# Patient Record
Sex: FEMALE | Race: WHITE | NOT HISPANIC OR LATINO | Employment: STUDENT | URBAN - NONMETROPOLITAN AREA
[De-identification: names, ages, dates, MRNs, and addresses within clinical notes are randomized per-mention and may not be internally consistent; named-entity substitution may affect disease eponyms.]

---
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
## 2022-03-21 NOTE — PROGRESS NOTES
Adolescent Partial RN Group Note and Check List      DATE: 5/8/18  Start Time 1000  End Time 1100    Data:  Communication Skills      Assessment: Patient with therapist for part of group. Patient participated in group activity and group discussion while in group. Patient denies SI/HI. No distress noted.                                                                                                                                                  Plan: Will continue to monitor and encourage.                                                               Oversight provided by psychiatrist including communication with staff delivering services: Yes                                                                         Continuous nursing coverage provided: Yes     Medication education provided       Yes     No X       Labor and Delivery OB Triage                                                  Nonstress Testing Note     S:  Patient is a 24 yo  female @ 37.4 weeks with EDC 2022 with LMP  10/12/21. She presents for scheduled  testing secondary to:[place]lig[place]. Her last SERGEI was 8.1 on 3/18/2022. Last US for growth showed EFW @ 3%, suggestive of symmetrically small fetus. She reports +FM, denies LOF, VB or VD. She is doing well and is without complaint. O:  Maternal Vitals   Temp: 98.5 °F (36.9 °C)   Temp Source: Oral   BP: 133/72   Pulse: 84   Resp: 16     NST: Category 1     FHR: 140s R, +Accels, No Decels, Moderate Variability. [place]: Irregular contractions   Cervix: not examined     BPP 8/8, SERGEI 10. 3! A: 23yo  @ 37.4 weeks, EDC 2022, LMP 10/12/2021 in for NST secondary to    Oligohydramnios of 8.1 on 3/18/2022. -Reactive NST/BPP / with SERGEI 10.3  P: Discharge home with instructions, and fetal kick counts. Continue scheduled  testing and ` Follow-up with Dr. Vaishali Blevins MD on 3/22/2022 @ [phone].

## 2022-03-25 NOTE — PATIENT INSTRUCTIONS
Dizziness  Dizziness is a common problem. It makes you feel unsteady or light-headed. You may feel like you are about to pass out (faint). Dizziness can lead to getting hurt if you stumble or fall. Dizziness can be caused by many things, including:  · Medicines.  · Not having enough water in your body (dehydration).  · Illness.  Follow these instructions at home:  Eating and drinking   · Drink enough fluid to keep your pee (urine) clear or pale yellow. This helps to keep you from getting dehydrated. Try to drink more clear fluids, such as water.  · Do not drink alcohol.  · Limit how much caffeine you drink or eat, if your doctor tells you to do that.  · Limit how much salt (sodium) you drink or eat, if your doctor tells you to do that.  Activity   · Avoid making quick movements.  ¨ When you stand up from sitting in a chair, steady yourself until you feel okay.  ¨ In the morning, first sit up on the side of the bed. When you feel okay, stand slowly while you hold onto something. Do this until you know that your balance is fine.  · If you need to  one place for a long time, move your legs often. Tighten and relax the muscles in your legs while you are standing.  · Do not drive or use heavy machinery if you feel dizzy.  · Avoid bending down if you feel dizzy. Place items in your home so you can reach them easily without leaning over.  Lifestyle   · Do not use any products that contain nicotine or tobacco, such as cigarettes and e-cigarettes. If you need help quitting, ask your doctor.  · Try to lower your stress level. You can do this by using methods such as yoga or meditation. Talk with your doctor if you need help.  General instructions   · Watch your dizziness for any changes.  · Take over-the-counter and prescription medicines only as told by your doctor. Talk with your doctor if you think that you are dizzy because of a medicine that you are taking.  · Tell a friend or a family member that you are feeling  dizzy. If he or she notices any changes in your behavior, have this person call your doctor.  · Keep all follow-up visits as told by your doctor. This is important.  Contact a doctor if:  · Your dizziness does not go away.  · Your dizziness or light-headedness gets worse.  · You feel sick to your stomach (nauseous).  · You have trouble hearing.  · You have new symptoms.  · You are unsteady on your feet.  · You feel like the room is spinning.  Get help right away if:  · You throw up (vomit) or have watery poop (diarrhea), and you cannot eat or drink anything.  · You have trouble:  ¨ Talking.  ¨ Walking.  ¨ Swallowing.  ¨ Using your arms, hands, or legs.  · You feel generally weak.  · You are not thinking clearly, or you have trouble forming sentences. A friend or family member may notice this.  · You have:  ¨ Chest pain.  ¨ Pain in your belly (abdomen).  ¨ Shortness of breath.  ¨ Sweating.  · Your vision changes.  · You are bleeding.  · You have a very bad headache.  · You have neck pain or a stiff neck.  · You have a fever.  These symptoms may be an emergency. Do not wait to see if the symptoms will go away. Get medical help right away. Call your local emergency services (911 in the [place] Do not drive yourself to the hospital.  Summary  · Dizziness makes you feel unsteady or light-headed. You may feel like you are about to pass out (faint).  · Drink enough fluid to keep your pee (urine) clear or pale yellow. Do not drink alcohol.  · Avoid making quick movements if you feel dizzy.  · Watch your dizziness for any changes.  This information is not intended to replace advice given to you by your health care provider. Make sure you discuss any questions you have with your health care provider.  Document Released: 12/06/2012 Document Revised: 01/04/2018 Document Reviewed: 01/04/2018  Elsevier Interactive Patient Education © 2017 [employer]  Otitis Media With Effusion  Otitis media with effusion is the presence of  "fluid in the middle ear. This is a common problem in children, which often follows ear infections. It may be present for weeks or longer after the infection. Unlike an acute ear infection, otitis media with effusion refers only to fluid behind the ear drum and not infection. Children with repeated ear and sinus infections and allergy problems are the most likely to get otitis media with effusion.  CAUSES   The most frequent cause of the fluid buildup is dysfunction of the eustachian tubes. These are the tubes that drain fluid in the ears to the back of the nose (nasopharynx).  SYMPTOMS   · The main symptom of this condition is hearing loss. As a result, you or your child may:  ¨ Listen to the TV at a loud volume.  ¨ Not respond to questions.  ¨ Ask \"what\" often when spoken to.  ¨ Mistake or confuse one sound or word for another.  · There may be a sensation of fullness or pressure but usually not pain.  DIAGNOSIS   · Your health care provider will diagnose this condition by examining you or your child's ears.  · Your health care provider may test the pressure in you or your child's ear with a tympanometer.  · A hearing test may be conducted if the problem persists.  TREATMENT   · Treatment depends on the duration and the effects of the effusion.  · Antibiotics, decongestants, nose drops, and cortisone-type drugs (tablets or nasal spray) may not be helpful.  · Children with persistent ear effusions may have delayed language or behavioral problems. Children at risk for developmental delays in hearing, learning, and speech may require referral to a specialist earlier than children not at risk.  · You or your child's health care provider may suggest a referral to an ear, nose, and throat surgeon for treatment. The following may help restore normal hearing:  ¨ Drainage of fluid.  ¨ Placement of ear tubes (tympanostomy tubes).  ¨ Removal of adenoids (adenoidectomy).  HOME CARE INSTRUCTIONS   · Avoid secondhand " smoke.  · Infants who are  are less likely to have this condition.  · Avoid feeding infants while they are lying flat.  · Avoid known environmental allergens.  · Avoid people who are sick.  SEEK MEDICAL CARE IF:   · Hearing is not better in 3 months.  · Hearing is worse.  · Ear pain.  · Drainage from the ear.  · Dizziness.  MAKE SURE YOU:   · Understand these instructions.  · Will watch your condition.  · Will get help right away if you are not doing well or get worse.  This information is not intended to replace advice given to you by your health care provider. Make sure you discuss any questions you have with your health care provider.  Document Released: 01/25/2006 Document Revised: 01/08/2016 Document Reviewed: 07/15/2014  Elsevier Interactive Patient Education © 2017 [employer] Inc[employer]     Statement Selected

## 2022-10-04 ENCOUNTER — OFFICE VISIT (OUTPATIENT)
Dept: PSYCHIATRY | Facility: CLINIC | Age: 22
End: 2022-10-04

## 2022-10-04 VITALS
SYSTOLIC BLOOD PRESSURE: 121 MMHG | HEIGHT: 62 IN | OXYGEN SATURATION: 98 % | BODY MASS INDEX: 46.01 KG/M2 | DIASTOLIC BLOOD PRESSURE: 80 MMHG | WEIGHT: 250 LBS | HEART RATE: 96 BPM | TEMPERATURE: 98.4 F

## 2022-10-04 DIAGNOSIS — F43.10 PTSD (POST-TRAUMATIC STRESS DISORDER): ICD-10-CM

## 2022-10-04 DIAGNOSIS — F41.1 GENERALIZED ANXIETY DISORDER: Primary | ICD-10-CM

## 2022-10-04 DIAGNOSIS — F90.0 ATTENTION DEFICIT HYPERACTIVITY DISORDER (ADHD), PREDOMINANTLY INATTENTIVE TYPE: ICD-10-CM

## 2022-10-04 DIAGNOSIS — F33.1 MDD (MAJOR DEPRESSIVE DISORDER), RECURRENT EPISODE, MODERATE: ICD-10-CM

## 2022-10-04 PROCEDURE — 90792 PSYCH DIAG EVAL W/MED SRVCS: CPT | Performed by: NURSE PRACTITIONER

## 2022-10-04 RX ORDER — DULOXETIN HYDROCHLORIDE 60 MG/1
60 CAPSULE, DELAYED RELEASE ORAL DAILY
Qty: 30 CAPSULE | Refills: 0 | Status: SHIPPED | OUTPATIENT
Start: 2022-10-04 | End: 2022-11-15 | Stop reason: SDUPTHER

## 2022-10-04 RX ORDER — DULOXETIN HYDROCHLORIDE 30 MG/1
30 CAPSULE, DELAYED RELEASE ORAL DAILY
Qty: 14 CAPSULE | Refills: 0 | Status: SHIPPED | OUTPATIENT
Start: 2022-10-04 | End: 2022-11-15
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
## 2022-10-10 ENCOUNTER — OFFICE VISIT (OUTPATIENT)
Dept: PSYCHIATRY | Facility: CLINIC | Age: 22
End: 2022-10-10

## 2022-10-10 DIAGNOSIS — F43.10 PTSD (POST-TRAUMATIC STRESS DISORDER): ICD-10-CM

## 2022-10-10 DIAGNOSIS — F90.0 ATTENTION DEFICIT HYPERACTIVITY DISORDER (ADHD), PREDOMINANTLY INATTENTIVE TYPE: ICD-10-CM

## 2022-10-10 DIAGNOSIS — F41.1 GENERALIZED ANXIETY DISORDER: Primary | ICD-10-CM

## 2022-10-10 DIAGNOSIS — F33.1 MDD (MAJOR DEPRESSIVE DISORDER), RECURRENT EPISODE, MODERATE: ICD-10-CM

## 2022-10-10 PROCEDURE — 90791 PSYCH DIAGNOSTIC EVALUATION: CPT | Performed by: COUNSELOR
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
## 2022-11-15 ENCOUNTER — OFFICE VISIT (OUTPATIENT)
Dept: PSYCHIATRY | Facility: CLINIC | Age: 22
End: 2022-11-15

## 2022-11-15 VITALS
TEMPERATURE: 97.8 F | HEART RATE: 101 BPM | HEIGHT: 62 IN | OXYGEN SATURATION: 96 % | SYSTOLIC BLOOD PRESSURE: 132 MMHG | DIASTOLIC BLOOD PRESSURE: 82 MMHG | WEIGHT: 261.8 LBS | BODY MASS INDEX: 48.18 KG/M2

## 2022-11-15 DIAGNOSIS — F90.0 ATTENTION DEFICIT HYPERACTIVITY DISORDER (ADHD), PREDOMINANTLY INATTENTIVE TYPE: ICD-10-CM

## 2022-11-15 DIAGNOSIS — F33.1 MDD (MAJOR DEPRESSIVE DISORDER), RECURRENT EPISODE, MODERATE: ICD-10-CM

## 2022-11-15 DIAGNOSIS — F43.10 PTSD (POST-TRAUMATIC STRESS DISORDER): ICD-10-CM

## 2022-11-15 DIAGNOSIS — F41.1 GENERALIZED ANXIETY DISORDER: Primary | ICD-10-CM

## 2022-11-15 DIAGNOSIS — F42.2 MIXED OBSESSIONAL THOUGHTS AND ACTS: ICD-10-CM

## 2022-11-15 PROCEDURE — 99214 OFFICE O/P EST MOD 30 MIN: CPT | Performed by: NURSE PRACTITIONER

## 2022-11-15 RX ORDER — ERGOCALCIFEROL 1.25 MG/1
CAPSULE ORAL
COMMUNITY
Start: 2022-10-06

## 2022-11-15 RX ORDER — DULOXETIN HYDROCHLORIDE 60 MG/1
60 CAPSULE, DELAYED RELEASE ORAL DAILY
Qty: 30 CAPSULE | Refills: 2 | Status: SHIPPED | OUTPATIENT
Start: 2022-11-15 | End: 2023-02-15 | Stop reason: SDUPTHER

## 2022-11-15 NOTE — PROGRESS NOTES
"Joanna Lira is a 22 y.o. female is here today for medication management follow-up.    Chief Complaint:  Recheck on depression and anxiety    History of Present Illness: Patient presents for follow-up at the [facility].  She states that she is doing much better on the Cymbalta.  She currently rates anxiety a 4 out of 10 with 10 being severe.  She has not had panic attacks or flashbacks since beginning the Cymbalta.  She still has some depressive days but states that she is able to pull herself out of that and that that type of mood is not staying.  No negative side effects to the meds.  She is still living with her mom.  Says things are going well there.  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  Her mood is stable.Body mass index is 47.87 kg/m². weight gain 11 lbs since last visit.  Sleeping well.  She does have some vivid dreams occurring but these are not nightmares and she states this is not bothersome.  She continues to have some intrusive thoughts at bedtime.  States that she still thinks that she has to go tell her mom and her sister him that she loves them or \"something bad will happen\".  She says that this is not taking up too much of her time and she feels like she can manage it.  No medical stressors.  She has started therapy and really enjoyed it    The following portions of the patient's history were reviewed and updated as appropriate: allergies, current medications, past family history, past medical history, past social history, past surgical history and problem list.    Review of Systems   Constitutional: Negative for activity change, appetite change and fatigue.   HENT: Negative.    Eyes: Negative for visual disturbance.   Respiratory: Negative.    Cardiovascular: Negative.    Gastrointestinal: Negative for nausea.   Endocrine: Negative.    Genitourinary: Negative.    Musculoskeletal: Negative for " "arthralgias.   Skin: Negative.    Allergic/Immunologic: Negative.    Neurological: Negative for dizziness, seizures and headaches.   Hematological: Negative.    Psychiatric/Behavioral: Negative for agitation, behavioral problems, confusion, decreased concentration, dysphoric mood, hallucinations, self-injury, sleep disturbance and suicidal ideas. The patient is not nervous/anxious and is not hyperactive.        Objective   Physical Exam  Vitals reviewed.   Constitutional:       Appearance: Normal appearance.   Musculoskeletal:      Cervical back: Normal range of motion and neck supple.   Neurological:      General: No focal deficit present.      Mental Status: She is alert and oriented to person, place, and time.   Psychiatric:         Attention and Perception: Attention normal.         Mood and Affect: Mood normal.         Speech: Speech normal.         Behavior: Behavior normal. Behavior is cooperative.         Thought Content: Thought content normal.         Cognition and Memory: Cognition normal.      Comments: Pleasant and cooperative         Blood pressure 132/82, pulse 101, temperature 97.8 °F (36.6 °C), height 157.5 cm (62.01\"), weight 119 kg (261 lb 12.8 oz), SpO2 96 %.    Medication List:   Current Outpatient Medications   Medication Sig Dispense Refill   • DULoxetine (CYMBALTA) 60 MG capsule Take 1 capsule by mouth Daily. 30 capsule 2   • vitamin D (ERGOCALCIFEROL) 1.25 MG (38716 UT) capsule capsule        No current facility-administered medications for this visit.       Mental Status Exam:   Hygiene:   good  Cooperation:  Cooperative  Eye Contact:  Good  Psychomotor Behavior:  Appropriate  Affect:  Full range  Hopelessness: Denies  Speech:  Normal  Thought Process:  Goal directed  Thought Content:  Normal  Suicidal:  None  Homicidal:  None  Hallucinations:  None  Delusion:  None  Memory:  Intact  Orientation:  Person, Place, Time and Situation  Reliability:  good  Insight:  Good  Judgement:  " Good  Impulse Control:  Fair  Physical/Medical Issues:  No     Assessment & Plan   Problems Addressed this Visit        Mental Health    PTSD (post-traumatic stress disorder)    Relevant Medications    DULoxetine (CYMBALTA) 60 MG capsule    Attention deficit hyperactivity disorder (ADHD), predominantly inattentive type    Relevant Medications    DULoxetine (CYMBALTA) 60 MG capsule   Other Visit Diagnoses     Generalized anxiety disorder    -  Primary    Relevant Medications    DULoxetine (CYMBALTA) 60 MG capsule    MDD (major depressive disorder), recurrent episode, moderate (HCC)        Relevant Medications    DULoxetine (CYMBALTA) 60 MG capsule    Mixed obsessional thoughts and acts        Relevant Medications    DULoxetine (CYMBALTA) 60 MG capsule      Diagnoses       Codes Comments    Generalized anxiety disorder    -  Primary ICD-10-CM: F41.1  ICD-9-CM: 300.02     PTSD (post-traumatic stress disorder)     ICD-10-CM: F43.10  ICD-9-CM: 309.81     Attention deficit hyperactivity disorder (ADHD), predominantly inattentive type     ICD-10-CM: F90.0  ICD-9-CM: 314.00     MDD (major depressive disorder), recurrent episode, moderate (HCC)     ICD-10-CM: F33.1  ICD-9-CM: 296.32     Mixed obsessional thoughts and acts     ICD-10-CM: F42.2  ICD-9-CM: 300.3         Functionality: pt having minimal impairment in important areas of daily functioning.  Prognosis: Good dependent on medication/follow up and treatment plan compliance.    She is currently pleased with her progress.  I discussed with her that if the OCD becomes an issue as far as taking up too much of her time or causing distress we can always increase the Cymbalta for that.  For now she does not feel like that is needed.  She will continue the Cymbalta for the anxiety depression, PTSD and panic.  She does not feel like she needs any other medications right now.  She has started therapy and I encouraged her to continue with this as this is the most important  aspect and will probably also help with that OCD component of intrusive thoughts.  She is in agreement. Continuing efforts to promote the therapeutic alliance, address the patient's issues, and strengthen self awareness, insights, and coping skills      Patient is agreeable to call the Clinic with worsening symptoms.    Patient is aware to call 911 or go to the nearest ER should begin having SI/HI. RTC 3 months             This document has been electronically signed by ANNA Vora on   November 15, 2022 09:32 EST.

## 2023-02-15 ENCOUNTER — TELEMEDICINE (OUTPATIENT)
Dept: PSYCHIATRY | Facility: CLINIC | Age: 23
End: 2023-02-15
Payer: COMMERCIAL

## 2023-02-15 DIAGNOSIS — F43.10 PTSD (POST-TRAUMATIC STRESS DISORDER): ICD-10-CM

## 2023-02-15 DIAGNOSIS — F33.1 MDD (MAJOR DEPRESSIVE DISORDER), RECURRENT EPISODE, MODERATE: ICD-10-CM

## 2023-02-15 DIAGNOSIS — F51.5 NIGHTMARES: ICD-10-CM

## 2023-02-15 DIAGNOSIS — F41.1 GENERALIZED ANXIETY DISORDER: Primary | ICD-10-CM

## 2023-02-15 DIAGNOSIS — F42.2 MIXED OBSESSIONAL THOUGHTS AND ACTS: ICD-10-CM

## 2023-02-15 PROCEDURE — 99214 OFFICE O/P EST MOD 30 MIN: CPT | Performed by: NURSE PRACTITIONER

## 2023-02-15 RX ORDER — DULOXETIN HYDROCHLORIDE 30 MG/1
30 CAPSULE, DELAYED RELEASE ORAL DAILY
Qty: 30 CAPSULE | Refills: 1 | Status: SHIPPED | OUTPATIENT
Start: 2023-02-15 | End: 2024-02-15

## 2023-02-15 RX ORDER — DULOXETIN HYDROCHLORIDE 60 MG/1
60 CAPSULE, DELAYED RELEASE ORAL DAILY
Qty: 30 CAPSULE | Refills: 1 | Status: SHIPPED | OUTPATIENT
Start: 2023-02-15

## 2023-02-15 RX ORDER — PRAZOSIN HYDROCHLORIDE 1 MG/1
1 CAPSULE ORAL NIGHTLY
Qty: 30 CAPSULE | Refills: 1 | Status: SHIPPED | OUTPATIENT
Start: 2023-02-15

## 2023-02-15 RX ORDER — OMEPRAZOLE 40 MG/1
CAPSULE, DELAYED RELEASE ORAL
COMMUNITY
Start: 2022-12-19
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
## 2023-04-11 ENCOUNTER — TELEMEDICINE (OUTPATIENT)
Dept: PSYCHIATRY | Facility: CLINIC | Age: 23
End: 2023-04-11
Payer: COMMERCIAL

## 2023-04-11 DIAGNOSIS — F43.10 PTSD (POST-TRAUMATIC STRESS DISORDER): ICD-10-CM

## 2023-04-11 DIAGNOSIS — F42.2 MIXED OBSESSIONAL THOUGHTS AND ACTS: ICD-10-CM

## 2023-04-11 DIAGNOSIS — G47.9 TROUBLE IN SLEEPING: ICD-10-CM

## 2023-04-11 DIAGNOSIS — F41.1 GENERALIZED ANXIETY DISORDER: Primary | ICD-10-CM

## 2023-04-11 DIAGNOSIS — F33.1 MDD (MAJOR DEPRESSIVE DISORDER), RECURRENT EPISODE, MODERATE: ICD-10-CM

## 2023-04-11 DIAGNOSIS — F51.5 NIGHTMARES: ICD-10-CM

## 2023-04-11 PROCEDURE — 99214 OFFICE O/P EST MOD 30 MIN: CPT | Performed by: NURSE PRACTITIONER

## 2023-04-11 PROCEDURE — 1160F RVW MEDS BY RX/DR IN RCRD: CPT | Performed by: NURSE PRACTITIONER

## 2023-04-11 PROCEDURE — 1159F MED LIST DOCD IN RCRD: CPT | Performed by: NURSE PRACTITIONER

## 2023-04-11 RX ORDER — HYDROXYZINE HYDROCHLORIDE 25 MG/1
TABLET, FILM COATED ORAL
Qty: 30 TABLET | Refills: 1 | Status: SHIPPED | OUTPATIENT
Start: 2023-04-11

## 2023-04-11 RX ORDER — PRAZOSIN HYDROCHLORIDE 1 MG/1
1 CAPSULE ORAL NIGHTLY
Qty: 30 CAPSULE | Refills: 1 | Status: SHIPPED | OUTPATIENT
Start: 2023-04-11

## 2023-04-11 RX ORDER — DULOXETIN HYDROCHLORIDE 30 MG/1
30 CAPSULE, DELAYED RELEASE ORAL DAILY
Qty: 30 CAPSULE | Refills: 1 | Status: SHIPPED | OUTPATIENT
Start: 2023-04-11 | End: 2024-04-10

## 2023-04-11 RX ORDER — DULOXETIN HYDROCHLORIDE 60 MG/1
60 CAPSULE, DELAYED RELEASE ORAL DAILY
Qty: 30 CAPSULE | Refills: 1 | Status: SHIPPED | OUTPATIENT
Start: 2023-04-11

## 2023-04-11 NOTE — PROGRESS NOTES
Joanna Lira is a 22 y.o. female is here today for medication management follow-up.“This provider is located at [facility], [address] [place] [address]. The provider identified herself as well as her credentials.   The Patient is located in her car. The patient's condition being diagnosed/treated is appropriate for telemedicine. The patient gave consent to be seen remotely, and when consent is given they understand that the consent allows for patient identifiable information to be sent to a third party as needed.   They may refuse to be seen remotely at any time. The electronic data is encrypted and password protected, and the patient has been advised of the potential risks to privacy not withstanding such measures.  Mother and sister are both located in the car and she gives permission for the visit to occur in front of them.    Chief Complaint:  Recheck on depression and anxiety    History of Present Illness: Patient presents for her follow-up.  She states that the increase in the Cymbalta did definitely help with her depression.  She and her boyfriend have broken up recently.  She is handling this.  She currently rates her depression a 3-4 out of 10 with 10 being severe.  Rates anxiety a 5 out of 10.  She says the increase in the Cymbalta did really help with her depressive symptoms and she feels like medications are adequate presently.  The prazosin did stop her nightmares however she is waking up several times a night now.  She this has been since the increase in the Cymbalta.  Her mood is stable.  No medical stressors.  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  She hopes to get a job at [facility] [employer] [facility] [place]. PHQ-2 Depression Screening  Little interest or pleasure in doing things? 0-->not at all   Feeling down, depressed, or hopeless? 0-->not at all   PHQ-2 Total Score 0         The following portions of the patient's history were reviewed and updated as appropriate:  allergies, current medications, past family history, past medical history, past social history, past surgical history and problem list.    Review of Systems   Constitutional: Negative for activity change, appetite change and fatigue.   HENT: Negative.    Eyes: Negative for visual disturbance.   Respiratory: Negative.    Cardiovascular: Negative.    Gastrointestinal: Negative for nausea.   Endocrine: Negative.    Genitourinary: Negative.    Musculoskeletal: Negative for arthralgias.   Skin: Negative.    Allergic/Immunologic: Negative.    Neurological: Negative for dizziness, seizures and headaches.   Hematological: Negative.    Psychiatric/Behavioral: Positive for sleep disturbance. Negative for agitation, behavioral problems, confusion, decreased concentration, dysphoric mood, hallucinations, self-injury and suicidal ideas. The patient is not nervous/anxious and is not hyperactive.      Reviewed copied data and there are no changes    Objective   Physical Exam  Vitals reviewed.   Constitutional:       Appearance: Normal appearance.   Musculoskeletal:      Cervical back: Normal range of motion and neck supple.   Neurological:      General: No focal deficit present.      Mental Status: She is alert and oriented to person, place, and time.   Psychiatric:         Attention and Perception: Attention normal.         Mood and Affect: Mood normal.         Speech: Speech normal.         Behavior: Behavior normal. Behavior is cooperative.         Thought Content: Thought content normal.         Cognition and Memory: Cognition normal.      Comments: Pleasant and cooperative         There were no vitals taken for this visit.    Medication List:   Current Outpatient Medications   Medication Sig Dispense Refill   • DULoxetine (Cymbalta) 30 MG capsule Take 1 capsule by mouth Daily. Take along with the 30mg for total of 90mg 30 capsule 1   • DULoxetine (CYMBALTA) 60 MG capsule Take 1 capsule by mouth Daily. Take along with the 30mg  for total of 90mg 30 capsule 1   • prazosin (MINIPRESS) 1 MG capsule Take 1 capsule by mouth Every Night. 30 capsule 1   • hydrOXYzine (ATARAX) 25 MG tablet 1/2-1 po at HS 30 tablet 1   • omeprazole (priLOSEC) 40 MG capsule      • vitamin D (ERGOCALCIFEROL) 1.25 MG (42793 UT) capsule capsule        No current facility-administered medications for this visit.     Reviewed copied data and there are no changes    Mental Status Exam:   Hygiene:   good  Cooperation:  Cooperative  Eye Contact:  Good  Psychomotor Behavior:  Appropriate  Affect:  Full range  Hopelessness: Denies  Speech:  Normal  Thought Process:  Goal directed  Thought Content:  Normal  Suicidal:  None  Homicidal:  None  Hallucinations:  None  Delusion:  None  Memory:  Intact  Orientation:  Person, Place, Time and Situation  Reliability:  good  Insight:  Good  Judgement:  Good  Impulse Control:  Fair  Physical/Medical Issues:  No     Assessment & Plan   Problems Addressed this Visit        Mental Health    PTSD (post-traumatic stress disorder)    Relevant Medications    DULoxetine (CYMBALTA) 60 MG capsule    DULoxetine (Cymbalta) 30 MG capsule    hydrOXYzine (ATARAX) 25 MG tablet   Other Visit Diagnoses     Generalized anxiety disorder    -  Primary    Relevant Medications    DULoxetine (CYMBALTA) 60 MG capsule    DULoxetine (Cymbalta) 30 MG capsule    hydrOXYzine (ATARAX) 25 MG tablet    MDD (major depressive disorder), recurrent episode, moderate        Relevant Medications    DULoxetine (CYMBALTA) 60 MG capsule    DULoxetine (Cymbalta) 30 MG capsule    hydrOXYzine (ATARAX) 25 MG tablet    Mixed obsessional thoughts and acts        Relevant Medications    DULoxetine (CYMBALTA) 60 MG capsule    DULoxetine (Cymbalta) 30 MG capsule    Nightmares        Relevant Medications    DULoxetine (CYMBALTA) 60 MG capsule    DULoxetine (Cymbalta) 30 MG capsule    prazosin (MINIPRESS) 1 MG capsule    hydrOXYzine (ATARAX) 25 MG tablet    Trouble in sleeping         Relevant Medications    hydrOXYzine (ATARAX) 25 MG tablet      Diagnoses       Codes Comments    Generalized anxiety disorder    -  Primary ICD-10-CM: F41.1  ICD-9-CM: 300.02     MDD (major depressive disorder), recurrent episode, moderate     ICD-10-CM: F33.1  ICD-9-CM: 296.32     PTSD (post-traumatic stress disorder)     ICD-10-CM: F43.10  ICD-9-CM: 309.81     Mixed obsessional thoughts and acts     ICD-10-CM: F42.2  ICD-9-CM: 300.3     Nightmares     ICD-10-CM: F51.5  ICD-9-CM: 307.47     Trouble in sleeping     ICD-10-CM: G47.9  ICD-9-CM: 780.50         Functionality: pt having minimal impairment in important areas of daily functioning.  Prognosis: Good dependent on medication/follow up and treatment plan compliance.    She is currently pleased with progress.  She will continue the Cymbalta for the PTSD, anxiety and depression as well as OCD.  Continue the prazosin for nightmares.  I am going to add some hydroxyzine as needed for sleep.  She will start by taking half a tablet if this does not help she can take a whole tablet.  Refills have been submitted.. Continuing efforts to promote the therapeutic alliance, address the patient's issues, and strengthen self awareness, insights, and coping skills      Patient is agreeable to call the Clinic with worsening symptoms.    Patient is aware to call 911 or go to the nearest ER should begin having SI/HI. RTC 2 months.  Sooner if needed             This document has been electronically signed by ANNA Vora on   April 11, 2023 16:28 EDT.

## 2023-06-08 ENCOUNTER — TELEMEDICINE (OUTPATIENT)
Dept: PSYCHIATRY | Facility: CLINIC | Age: 23
End: 2023-06-08
Payer: COMMERCIAL

## 2023-06-08 DIAGNOSIS — F33.1 MDD (MAJOR DEPRESSIVE DISORDER), RECURRENT EPISODE, MODERATE: ICD-10-CM

## 2023-06-08 DIAGNOSIS — F43.10 PTSD (POST-TRAUMATIC STRESS DISORDER): ICD-10-CM

## 2023-06-08 DIAGNOSIS — G47.9 TROUBLE IN SLEEPING: ICD-10-CM

## 2023-06-08 DIAGNOSIS — F41.1 GENERALIZED ANXIETY DISORDER: Primary | ICD-10-CM

## 2023-06-08 DIAGNOSIS — F51.5 NIGHTMARES: ICD-10-CM

## 2023-06-08 DIAGNOSIS — F42.2 MIXED OBSESSIONAL THOUGHTS AND ACTS: ICD-10-CM

## 2023-06-08 PROCEDURE — 99214 OFFICE O/P EST MOD 30 MIN: CPT | Performed by: NURSE PRACTITIONER

## 2023-06-08 PROCEDURE — 1160F RVW MEDS BY RX/DR IN RCRD: CPT | Performed by: NURSE PRACTITIONER

## 2023-06-08 PROCEDURE — 1159F MED LIST DOCD IN RCRD: CPT | Performed by: NURSE PRACTITIONER

## 2023-06-08 RX ORDER — PROPRANOLOL HYDROCHLORIDE 10 MG/1
10 TABLET ORAL DAILY PRN
Qty: 30 TABLET | Refills: 2 | Status: SHIPPED | OUTPATIENT
Start: 2023-06-08

## 2023-06-08 RX ORDER — DULOXETIN HYDROCHLORIDE 30 MG/1
30 CAPSULE, DELAYED RELEASE ORAL DAILY
Qty: 30 CAPSULE | Refills: 2 | Status: SHIPPED | OUTPATIENT
Start: 2023-06-08 | End: 2024-06-07

## 2023-06-08 RX ORDER — PRAZOSIN HYDROCHLORIDE 1 MG/1
1 CAPSULE ORAL NIGHTLY
Qty: 30 CAPSULE | Refills: 2 | Status: SHIPPED | OUTPATIENT
Start: 2023-06-08

## 2023-06-08 RX ORDER — DULOXETIN HYDROCHLORIDE 60 MG/1
60 CAPSULE, DELAYED RELEASE ORAL DAILY
Qty: 30 CAPSULE | Refills: 2 | Status: SHIPPED | OUTPATIENT
Start: 2023-06-08

## 2023-06-08 RX ORDER — HYDROXYZINE HYDROCHLORIDE 25 MG/1
TABLET, FILM COATED ORAL
Qty: 30 TABLET | Refills: 2 | Status: SHIPPED | OUTPATIENT
Start: 2023-06-08

## 2023-06-08 NOTE — PROGRESS NOTES
Joanna Lira is a 22 y.o. female is here today for medication management follow-up.“This provider is located at [facility], [address] [place] [address]. The provider identified herself as well as her credentials.   The Patient is located in her car. The patient's condition being diagnosed/treated is appropriate for telemedicine. The patient gave consent to be seen remotely, and when consent is given they understand that the consent allows for patient identifiable information to be sent to a third party as needed.   They may refuse to be seen remotely at any time. The electronic data is encrypted and password protected, and the patient has been advised of the potential risks to privacy not withstanding such measures.  Mother and sister are both located in the car and she gives permission for the visit to occur in front of them.    Chief Complaint:  Recheck on depression and anxiety    History of Present Illness: Patient states that she continues to do very well.  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  She states she got very nervous.  This that she has always had severe test taking anxiety basically her entire life.  She denies any current depression or excessive anxiety.  Her has recently quit her job at [employer] due to working conditions and is looking for another part-time job now.  Sleeping well at night without difficulty.  No panic attacks.  No nightmares.  No medical stressors.  He says the hydroxyzine some for anxiety but it does make her drowsy mood is stable.  The increase with the Cymbalta dosing continues to help.  She is very pleased with her progress.        The following portions of the patient's history were reviewed and updated as appropriate: allergies, current medications, past family history, past medical history, past social history, past surgical history and problem list.    Review of Systems   Constitutional:  Negative for activity  change, appetite change and fatigue.   HENT: Negative.     Eyes:  Negative for visual disturbance.   Respiratory: Negative.     Cardiovascular: Negative.    Gastrointestinal:  Negative for nausea.   Endocrine: Negative.    Genitourinary: Negative.    Musculoskeletal:  Negative for arthralgias.   Skin: Negative.    Allergic/Immunologic: Negative.    Neurological:  Negative for dizziness, seizures and headaches.   Hematological: Negative.    Psychiatric/Behavioral:  Negative for agitation, behavioral problems, confusion, decreased concentration, dysphoric mood, hallucinations, self-injury, sleep disturbance and suicidal ideas. The patient is nervous/anxious. The patient is not hyperactive.    Reviewed copied data and there are no changes    Objective   Physical Exam  Vitals reviewed.   Constitutional:       Appearance: Normal appearance.   Musculoskeletal:      Cervical back: Normal range of motion and neck supple.   Neurological:      General: No focal deficit present.      Mental Status: She is alert and oriented to person, place, and time.   Psychiatric:         Attention and Perception: Attention normal.         Mood and Affect: Mood normal.         Speech: Speech normal.         Behavior: Behavior normal. Behavior is cooperative.         Thought Content: Thought content normal.         Cognition and Memory: Cognition normal.      Comments: Pleasant and cooperative       There were no vitals taken for this visit.    Medication List:   Current Outpatient Medications   Medication Sig Dispense Refill    DULoxetine (Cymbalta) 30 MG capsule Take 1 capsule by mouth Daily. Take along with the 30mg for total of 90mg 30 capsule 2    DULoxetine (CYMBALTA) 60 MG capsule Take 1 capsule by mouth Daily. Take along with the 30mg for total of 90mg 30 capsule 2    hydrOXYzine (ATARAX) 25 MG tablet 1/2-1 po at HS 30 tablet 2    prazosin (MINIPRESS) 1 MG capsule Take 1 capsule by mouth Every Night. 30 capsule 2    omeprazole  (priLOSEC) 40 MG capsule       propranolol (INDERAL) 10 MG tablet Take 1 tablet by mouth Daily As Needed (anxiety). 30 tablet 2    vitamin D (ERGOCALCIFEROL) 1.25 MG (64190 UT) capsule capsule        No current facility-administered medications for this visit.     Reviewed copied data and there are no changes    Mental Status Exam:   Hygiene:   good  Cooperation:  Cooperative  Eye Contact:  Good  Psychomotor Behavior:  Appropriate  Affect:  Full range  Hopelessness: Denies  Speech:  Normal  Thought Process:  Goal directed  Thought Content:  Normal  Suicidal:  None  Homicidal:  None  Hallucinations:  None  Delusion:  None  Memory:  Intact  Orientation:  Person, Place, Time and Situation  Reliability:  good  Insight:  Good  Judgement:  Good  Impulse Control:  Fair  Physical/Medical Issues:  No     Assessment & Plan   Problems Addressed this Visit          Mental Health    PTSD (post-traumatic stress disorder)    Relevant Medications    hydrOXYzine (ATARAX) 25 MG tablet    DULoxetine (CYMBALTA) 60 MG capsule    DULoxetine (Cymbalta) 30 MG capsule     Other Visit Diagnoses       Generalized anxiety disorder    -  Primary    Relevant Medications    hydrOXYzine (ATARAX) 25 MG tablet    DULoxetine (CYMBALTA) 60 MG capsule    DULoxetine (Cymbalta) 30 MG capsule    propranolol (INDERAL) 10 MG tablet    MDD (major depressive disorder), recurrent episode, moderate        Relevant Medications    hydrOXYzine (ATARAX) 25 MG tablet    DULoxetine (CYMBALTA) 60 MG capsule    DULoxetine (Cymbalta) 30 MG capsule    Mixed obsessional thoughts and acts        Relevant Medications    DULoxetine (CYMBALTA) 60 MG capsule    DULoxetine (Cymbalta) 30 MG capsule    Nightmares        Relevant Medications    prazosin (MINIPRESS) 1 MG capsule    hydrOXYzine (ATARAX) 25 MG tablet    DULoxetine (CYMBALTA) 60 MG capsule    DULoxetine (Cymbalta) 30 MG capsule    Trouble in sleeping        Relevant Medications    hydrOXYzine (ATARAX) 25 MG tablet           Diagnoses         Codes Comments    Generalized anxiety disorder    -  Primary ICD-10-CM: F41.1  ICD-9-CM: 300.02     MDD (major depressive disorder), recurrent episode, moderate     ICD-10-CM: F33.1  ICD-9-CM: 296.32     PTSD (post-traumatic stress disorder)     ICD-10-CM: F43.10  ICD-9-CM: 309.81     Mixed obsessional thoughts and acts     ICD-10-CM: F42.2  ICD-9-CM: 300.3     Nightmares     ICD-10-CM: F51.5  ICD-9-CM: 307.47     Trouble in sleeping     ICD-10-CM: G47.9  ICD-9-CM: 780.50           Functionality: pt having minimal impairment in important areas of daily functioning.  Prognosis: Good dependent on medication/follow up and treatment plan compliance.    She is currently pleased with progress.  She will continue the Cymbalta for the PTSD, anxiety and depression as well as OCD.  Continue the prazosin for nightmares.  I am going to add some Inderal as needed for anxiety for her test taking.  I did discuss with her the risks, benefits, side effects and how taking it along with prazosin can cause some low blood pressure so she is going to monitor for this.  Refills have been submitted.. Continuing efforts to promote the therapeutic alliance, address the patient's issues, and strengthen self awareness, insights, and coping skills  Patient screened positive for depression based on a PHQ-9 score of 0 on 4/11/2023. Follow-up recommendations include: Prescribed antidepressant medication treatment.        Patient is agreeable to call the Clinic with worsening symptoms.    Patient is aware to call 911 or go to the nearest ER should begin having SI/HI. RTC 3 months.  Sooner if needed             This document has been electronically signed by ANNA Vora on   June 8, 2023 15:26 EDT.

## 2023-08-16 ENCOUNTER — OFFICE VISIT (OUTPATIENT)
Dept: PSYCHIATRY | Facility: CLINIC | Age: 23
End: 2023-08-16
Payer: COMMERCIAL

## 2023-08-16 VITALS
OXYGEN SATURATION: 99 % | WEIGHT: 271.8 LBS | TEMPERATURE: 98.2 F | DIASTOLIC BLOOD PRESSURE: 83 MMHG | BODY MASS INDEX: 50.02 KG/M2 | HEART RATE: 100 BPM | HEIGHT: 62 IN | SYSTOLIC BLOOD PRESSURE: 112 MMHG

## 2023-08-16 DIAGNOSIS — F90.0 ATTENTION DEFICIT HYPERACTIVITY DISORDER (ADHD), PREDOMINANTLY INATTENTIVE TYPE: ICD-10-CM

## 2023-08-16 DIAGNOSIS — F42.2 MIXED OBSESSIONAL THOUGHTS AND ACTS: ICD-10-CM

## 2023-08-16 DIAGNOSIS — F51.5 NIGHTMARES: ICD-10-CM

## 2023-08-16 DIAGNOSIS — G47.9 TROUBLE IN SLEEPING: ICD-10-CM

## 2023-08-16 DIAGNOSIS — F50.81 MILD BINGE-EATING DISORDER: Primary | ICD-10-CM

## 2023-08-16 DIAGNOSIS — F33.1 MDD (MAJOR DEPRESSIVE DISORDER), RECURRENT EPISODE, MODERATE: ICD-10-CM

## 2023-08-16 DIAGNOSIS — F43.10 PTSD (POST-TRAUMATIC STRESS DISORDER): ICD-10-CM

## 2023-08-16 DIAGNOSIS — F41.1 GENERALIZED ANXIETY DISORDER: ICD-10-CM

## 2023-08-16 PROCEDURE — 99214 OFFICE O/P EST MOD 30 MIN: CPT | Performed by: NURSE PRACTITIONER

## 2023-08-16 PROCEDURE — 1160F RVW MEDS BY RX/DR IN RCRD: CPT | Performed by: NURSE PRACTITIONER

## 2023-08-16 PROCEDURE — 1159F MED LIST DOCD IN RCRD: CPT | Performed by: NURSE PRACTITIONER

## 2023-08-16 RX ORDER — HYDROXYZINE HYDROCHLORIDE 25 MG/1
TABLET, FILM COATED ORAL
Qty: 30 TABLET | Refills: 2 | Status: SHIPPED | OUTPATIENT
Start: 2023-08-16

## 2023-08-16 RX ORDER — DULOXETIN HYDROCHLORIDE 30 MG/1
30 CAPSULE, DELAYED RELEASE ORAL DAILY
Qty: 30 CAPSULE | Refills: 2 | Status: SHIPPED | OUTPATIENT
Start: 2023-08-16 | End: 2024-08-15

## 2023-08-16 RX ORDER — DULOXETIN HYDROCHLORIDE 60 MG/1
60 CAPSULE, DELAYED RELEASE ORAL DAILY
Qty: 30 CAPSULE | Refills: 2 | Status: SHIPPED | OUTPATIENT
Start: 2023-08-16

## 2023-08-16 RX ORDER — PRAZOSIN HYDROCHLORIDE 1 MG/1
1 CAPSULE ORAL NIGHTLY
Qty: 30 CAPSULE | Refills: 2 | Status: SHIPPED | OUTPATIENT
Start: 2023-08-16

## 2023-08-16 NOTE — PROGRESS NOTES
"      Joanna Lira is a 22 y.o. 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  She states that she started self doubting and feeling like she could not do it.  She is actually not very sure if that is what she wants to do.  That made her have some depressive days.  But she states overall she has no overt depression.  It is more situational.  Anxiety is present and situational.  She states that her mind is constantly going and will not shut off.  She gets overwhelmed very easily gets irritated and agitated from being overwhelmed.  Has trouble completing tasks.  Procrastinates.  Loses things frequently.  Has taken stimulants in the past.  Had various issues with them but she states she was so young and her life was so chaotic at that time she does not know.  When she drinks an energy drink it has no negative effect on her it helps her complete things and focus better.  She is sleeping well at night without difficulty.  Has an occasional nightmare but feels like her current dosage of prazosin is adequate.  She is taking the hydroxyzine for sleep as well.  Has Inderal at home to use as needed for anxiety but is not requiring the use of it.  Body mass index is 49.7 kg/mý.  Gain of 10 pound since last office visit.  She states that she does binge eat and has done this for as long as she can remember.  She states it occurs about once a week.  She will eat a very large consumption of food.  At times to the point where she vomits.  She feels very guilty afterwards.  She uses delta 9 thc gummies a couple times a week.  She states this is to slow her mind down so that she can sleep better because that is when her mind is the worst.  She denies any other substance use.  Continues to live with her mom and things are going " "well there.  She plans on possibly finding a job until she can figure out what she wants to do as far as furthering her education.        The following portions of the patient's history were reviewed and updated as appropriate: allergies, current medications, past family history, past medical history, past social history, past surgical history and problem list.    Review of Systems   Constitutional:  Negative for activity change, appetite change and fatigue.   HENT: Negative.     Eyes:  Negative for visual disturbance.   Respiratory: Negative.     Cardiovascular: Negative.    Gastrointestinal:  Negative for nausea.   Endocrine: Negative.    Genitourinary: Negative.    Musculoskeletal:  Negative for arthralgias.   Skin: Negative.    Allergic/Immunologic: Negative.    Neurological:  Negative for dizziness, seizures and headaches.   Hematological: Negative.    Psychiatric/Behavioral:  Negative for agitation, behavioral problems, confusion, decreased concentration, dysphoric mood, hallucinations, self-injury, sleep disturbance and suicidal ideas. The patient is nervous/anxious. The patient is not hyperactive.    Reviewed copied data and there are no changes    Objective   Physical Exam  Vitals reviewed.   Constitutional:       Appearance: Normal appearance.   Musculoskeletal:      Cervical back: Normal range of motion and neck supple.   Neurological:      General: No focal deficit present.      Mental Status: She is alert and oriented to person, place, and time.   Psychiatric:         Attention and Perception: Attention normal.         Mood and Affect: Mood normal.         Speech: Speech normal.         Behavior: Behavior normal. Behavior is cooperative.         Thought Content: Thought content normal.         Cognition and Memory: Cognition normal.      Comments: Pleasant and cooperative       Blood pressure 112/83, pulse 100, temperature 98.2 øF (36.8 øC), height 157.5 cm (62.01\"), weight 123 kg (271 lb 12.8 oz), SpO2 " 99 %.    Medication List:   Current Outpatient Medications   Medication Sig Dispense Refill    DULoxetine (Cymbalta) 30 MG capsule Take 1 capsule by mouth Daily. Take along with the 30mg for total of 90mg 30 capsule 2    DULoxetine (CYMBALTA) 60 MG capsule Take 1 capsule by mouth Daily. Take along with the 30mg for total of 90mg 30 capsule 2    hydrOXYzine (ATARAX) 25 MG tablet 1/2-1 po at HS 30 tablet 2    prazosin (MINIPRESS) 1 MG capsule Take 1 capsule by mouth Every Night. 30 capsule 2    lisdexamfetamine (Vyvanse) 30 MG capsule Take 1 capsule by mouth Every Morning 30 capsule 0    omeprazole (priLOSEC) 40 MG capsule       propranolol (INDERAL) 10 MG tablet Take 1 tablet by mouth Daily As Needed (anxiety). 30 tablet 2    vitamin D (ERGOCALCIFEROL) 1.25 MG (50490 UT) capsule capsule        No current facility-administered medications for this visit.     Reviewed copied data and there are no changes    Mental Status Exam:   Hygiene:   good  Cooperation:  Cooperative  Eye Contact:  Good  Psychomotor Behavior:  Appropriate  Affect:  Full range  Hopelessness: Denies  Speech:  Normal  Thought Process:  Goal directed  Thought Content:  Normal  Suicidal:  None  Homicidal:  None  Hallucinations:  None  Delusion:  None  Memory:  Intact  Orientation:  Person, Place, Time and Situation  Reliability:  good  Insight:  Good  Judgement:  Good  Impulse Control:  Fair  Physical/Medical Issues:  No     Assessment & Plan   Problems Addressed this Visit          Mental Health    PTSD (post-traumatic stress disorder)    Relevant Medications    lisdexamfetamine (Vyvanse) 30 MG capsule    DULoxetine (Cymbalta) 30 MG capsule    DULoxetine (CYMBALTA) 60 MG capsule    hydrOXYzine (ATARAX) 25 MG tablet    Attention deficit hyperactivity disorder (ADHD), predominantly inattentive type    Relevant Medications    lisdexamfetamine (Vyvanse) 30 MG capsule    DULoxetine (Cymbalta) 30 MG capsule    DULoxetine (CYMBALTA) 60 MG capsule     hydrOXYzine (ATARAX) 25 MG tablet     Other Visit Diagnoses       Mild binge-eating disorder    -  Primary    Relevant Medications    lisdexamfetamine (Vyvanse) 30 MG capsule    DULoxetine (Cymbalta) 30 MG capsule    DULoxetine (CYMBALTA) 60 MG capsule    hydrOXYzine (ATARAX) 25 MG tablet    Generalized anxiety disorder        Relevant Medications    lisdexamfetamine (Vyvanse) 30 MG capsule    DULoxetine (Cymbalta) 30 MG capsule    DULoxetine (CYMBALTA) 60 MG capsule    hydrOXYzine (ATARAX) 25 MG tablet    MDD (major depressive disorder), recurrent episode, moderate        Relevant Medications    lisdexamfetamine (Vyvanse) 30 MG capsule    DULoxetine (Cymbalta) 30 MG capsule    DULoxetine (CYMBALTA) 60 MG capsule    hydrOXYzine (ATARAX) 25 MG tablet    Mixed obsessional thoughts and acts        Relevant Medications    DULoxetine (Cymbalta) 30 MG capsule    DULoxetine (CYMBALTA) 60 MG capsule    Nightmares        Relevant Medications    lisdexamfetamine (Vyvanse) 30 MG capsule    DULoxetine (Cymbalta) 30 MG capsule    DULoxetine (CYMBALTA) 60 MG capsule    prazosin (MINIPRESS) 1 MG capsule    hydrOXYzine (ATARAX) 25 MG tablet    Trouble in sleeping        Relevant Medications    hydrOXYzine (ATARAX) 25 MG tablet          Diagnoses         Codes Comments    Mild binge-eating disorder    -  Primary ICD-10-CM: F50.81  ICD-9-CM: 307.1     Generalized anxiety disorder     ICD-10-CM: F41.1  ICD-9-CM: 300.02     PTSD (post-traumatic stress disorder)     ICD-10-CM: F43.10  ICD-9-CM: 309.81     MDD (major depressive disorder), recurrent episode, moderate     ICD-10-CM: F33.1  ICD-9-CM: 296.32     Mixed obsessional thoughts and acts     ICD-10-CM: F42.2  ICD-9-CM: 300.3     Nightmares     ICD-10-CM: F51.5  ICD-9-CM: 307.47     Trouble in sleeping     ICD-10-CM: G47.9  ICD-9-CM: 780.50     Attention deficit hyperactivity disorder (ADHD), predominantly inattentive type     ICD-10-CM: F90.0  ICD-9-CM: 314.00            Functionality: pt having moderate impairment in important areas of daily functioning.  Prognosis: Good dependent on medication/follow up and treatment plan compliance.  Rafita reviewed.  No medications listed.  Uds performed today andpending    We had a lengthy discussion.  I am going to try her back on a stimulant for the ADHD.  Risks, benefits, side effects of the medication were discussed.  Patient realizes the medication has abuse potential.As part of the patient's treatment plan they are being prescribed a controlled substance with potential for abuse.  The patient has been made aware of the appropriate use of such medications,  It has been made clear these medications are for use by the patient only, without concomitant use of alcohol or other substances unless prescribed/advised by medical provider. Patient has completed prescribing agreement detailing term of continued prescribing of controlled substances including monitoring RAFITA reports, urine drug screens, and pill counts.  The patient is aware RAFITA reports are reviewed on a regular basis and scanned into the chart. Patient is aware the medication has abuse potential.     Also discussed that these medications are not recommended in pregnancy.  Should she become sexually active she will need to seek out birth control prior to this.  She verbalized understanding.  So discussed the use of marijuana that it was not advised especially while taking a controlled substance.     She will continue the Cymbalta for the OCD, anxiety, depression and PTSD, continue the Minipress for nightmares continue hydroxyzine for sleep.  Adding the Vyvanse for the ADHD.  Refills of all been submitted.  She has failed Concerta and Adderall in the past so I will be trying to preauthorize Vyvanse.  Also scheduling her back with her therapist as I feel like this could benefit her with coping skills and helping her to make decisions as far as her future goes and she is in  agreement to this.   Continuing efforts to promote the therapeutic alliance, address the patient's issues, and strengthen self awareness, insights, and coping skill      Patient is agreeable to call the Clinic with worsening symptoms.    Patient is aware to call 911 or go to the nearest ER should begin having SI/HI. RTC 1 months.  Sooner if needed             This document has been electronically signed by ANNA Vora on   August 16, 2023 08:59 EDT.

## 2023-09-14 ENCOUNTER — TELEMEDICINE (OUTPATIENT)
Dept: PSYCHIATRY | Facility: CLINIC | Age: 23
End: 2023-09-14
Payer: COMMERCIAL

## 2023-09-14 DIAGNOSIS — F42.2 MIXED OBSESSIONAL THOUGHTS AND ACTS: ICD-10-CM

## 2023-09-14 DIAGNOSIS — F43.10 PTSD (POST-TRAUMATIC STRESS DISORDER): ICD-10-CM

## 2023-09-14 DIAGNOSIS — F51.5 NIGHTMARES: ICD-10-CM

## 2023-09-14 DIAGNOSIS — F33.1 MDD (MAJOR DEPRESSIVE DISORDER), RECURRENT EPISODE, MODERATE: ICD-10-CM

## 2023-09-14 DIAGNOSIS — G47.9 TROUBLE IN SLEEPING: ICD-10-CM

## 2023-09-14 DIAGNOSIS — F90.0 ATTENTION DEFICIT HYPERACTIVITY DISORDER (ADHD), PREDOMINANTLY INATTENTIVE TYPE: Primary | ICD-10-CM

## 2023-09-14 DIAGNOSIS — F41.1 GENERALIZED ANXIETY DISORDER: ICD-10-CM

## 2023-09-14 DIAGNOSIS — F50.81 MILD BINGE-EATING DISORDER: ICD-10-CM

## 2023-09-14 PROCEDURE — 99214 OFFICE O/P EST MOD 30 MIN: CPT | Performed by: NURSE PRACTITIONER

## 2023-09-14 PROCEDURE — 1159F MED LIST DOCD IN RCRD: CPT | Performed by: NURSE PRACTITIONER

## 2023-09-14 PROCEDURE — 1160F RVW MEDS BY RX/DR IN RCRD: CPT | Performed by: NURSE PRACTITIONER

## 2023-09-14 NOTE — PROGRESS NOTES
"      Joanna Lira is a 23 y.o. female is here today for medication management follow-up.Patient currently has covid infection.  ““This provider is located at [facility], [address] [place] [address]. The provider identified herself as well as her credentials.   The Patient is located at home. The patient's condition being diagnosed/treated is appropriate for telemedicine. The patient gave consent to be seen remotely, and when consent is given they understand that the consent allows for patient identifiable information to be sent to a third party as needed.   They may refuse to be seen remotely at any time. The electronic data is encrypted and password protected, and the patient has been advised of the potential risks to privacy not withstanding such measures.   Chief Complaint:  Recheck on depression and anxiety, adhd    History of Present Illness:   Patient states that she is doing \"wonderful\" on the Vyvanse.  She states that \"my brain is so silent\".  She states that she can think much more clear.  She is now working as a  at [employer].  States since she has started the medication she does not get as agitated with customers when they are agitated.  She can tell a big difference in her overall mood.  Focus and concentration is much better.  She states she cannot really tell if she is actually better with binge eating but her desires to binge eat are less.  She has had her wisdom teeth extracted since our last visit and currently has COVID infection so she states she cannot really gauge how much she will improve with binge eating on the medicine until all this has resolved.  She is sleeping well at night without difficulty.  No negative side effects to the meds.  Not having to use the Inderal now.  She states the Vyvanse has really helped with her overall anxiety.  Usually not taking it on the weekends if she does not work.  She denies any depression.  " Anxiety is manageable.  Mood is stable.  She is very much pleased        The following portions of the patient's history were reviewed and updated as appropriate: allergies, current medications, past family history, past medical history, past social history, past surgical history and problem list.    Review of Systems   Constitutional:  Negative for activity change, appetite change and fatigue.   HENT: Negative.     Eyes:  Negative for visual disturbance.   Respiratory: Negative.     Cardiovascular: Negative.    Gastrointestinal:  Negative for nausea.   Endocrine: Negative.    Genitourinary: Negative.    Musculoskeletal:  Negative for arthralgias.   Skin: Negative.    Allergic/Immunologic: Negative.    Neurological:  Negative for dizziness, seizures and headaches.   Hematological: Negative.    Psychiatric/Behavioral:  Negative for agitation, behavioral problems, confusion, decreased concentration, dysphoric mood, hallucinations, self-injury, sleep disturbance and suicidal ideas. The patient is nervous/anxious. The patient is not hyperactive.    Reviewed copied data and there are no changes    Objective   Physical Exam  Vitals reviewed.   Constitutional:       Appearance: Normal appearance.   Musculoskeletal:      Cervical back: Normal range of motion and neck supple.   Neurological:      General: No focal deficit present.      Mental Status: She is alert and oriented to person, place, and time.   Psychiatric:         Attention and Perception: Attention normal.         Mood and Affect: Mood normal.         Speech: Speech normal.         Behavior: Behavior normal. Behavior is cooperative.         Thought Content: Thought content normal.         Cognition and Memory: Cognition normal.      Comments: Pleasant and cooperative       There were no vitals taken for this visit.    Medication List:   Current Outpatient Medications   Medication Sig Dispense Refill    lisdexamfetamine (Vyvanse) 30 MG capsule Take 1 capsule by  mouth Every Morning 30 capsule 0    DULoxetine (Cymbalta) 30 MG capsule Take 1 capsule by mouth Daily. Take along with the 30mg for total of 90mg 30 capsule 2    DULoxetine (CYMBALTA) 60 MG capsule Take 1 capsule by mouth Daily. Take along with the 30mg for total of 90mg 30 capsule 2    hydrOXYzine (ATARAX) 25 MG tablet 1/2-1 po at HS 30 tablet 2    omeprazole (priLOSEC) 40 MG capsule       prazosin (MINIPRESS) 1 MG capsule Take 1 capsule by mouth Every Night. 30 capsule 2    vitamin D (ERGOCALCIFEROL) 1.25 MG (32429 UT) capsule capsule        No current facility-administered medications for this visit.     Reviewed copied data and there are no changes    Mental Status Exam:   Hygiene:   good  Cooperation:  Cooperative  Eye Contact:  Good  Psychomotor Behavior:  Appropriate  Affect:  Full range  Hopelessness: Denies  Speech:  Normal  Thought Process:  Goal directed  Thought Content:  Normal  Suicidal:  None  Homicidal:  None  Hallucinations:  None  Delusion:  None  Memory:  Intact  Orientation:  Person, Place, Time and Situation  Reliability:  good  Insight:  Good  Judgement:  Good  Impulse Control:  Fair  Physical/Medical Issues:  No     Assessment & Plan   Problems Addressed this Visit          Mental Health    PTSD (post-traumatic stress disorder)    Relevant Medications    lisdexamfetamine (Vyvanse) 30 MG capsule    Attention deficit hyperactivity disorder (ADHD), predominantly inattentive type - Primary    Relevant Medications    lisdexamfetamine (Vyvanse) 30 MG capsule     Other Visit Diagnoses       Mild binge-eating disorder        Relevant Medications    lisdexamfetamine (Vyvanse) 30 MG capsule    Generalized anxiety disorder        Relevant Medications    lisdexamfetamine (Vyvanse) 30 MG capsule    MDD (major depressive disorder), recurrent episode, moderate        Relevant Medications    lisdexamfetamine (Vyvanse) 30 MG capsule    Mixed obsessional thoughts and acts        Trouble in sleeping         Nightmares        Relevant Medications    lisdexamfetamine (Vyvanse) 30 MG capsule          Diagnoses         Codes Comments    Attention deficit hyperactivity disorder (ADHD), predominantly inattentive type    -  Primary ICD-10-CM: F90.0  ICD-9-CM: 314.00     Mild binge-eating disorder     ICD-10-CM: F50.81  ICD-9-CM: 307.1     Generalized anxiety disorder     ICD-10-CM: F41.1  ICD-9-CM: 300.02     PTSD (post-traumatic stress disorder)     ICD-10-CM: F43.10  ICD-9-CM: 309.81     MDD (major depressive disorder), recurrent episode, moderate     ICD-10-CM: F33.1  ICD-9-CM: 296.32     Mixed obsessional thoughts and acts     ICD-10-CM: F42.2  ICD-9-CM: 300.3     Trouble in sleeping     ICD-10-CM: G47.9  ICD-9-CM: 780.50     Nightmares     ICD-10-CM: F51.5  ICD-9-CM: 307.47           Functionality: pt having minimal impairment in important areas of daily functioning.  Prognosis: Good dependent on medication/follow up and treatment plan compliance.  Tae reviewed.  Shows recent hydrocodone per wisdom teeth extraction.    .   sHe is very pleased with her progress.  She will continue the Cymbalta for the OCD, anxiety, depression and PTSD, continue the Minipress for nightmares continue hydroxyzine for sleep.  Continue the Vyvanse for the ADHD.  Refills of all been submitted.  Continuing efforts to promote the therapeutic alliance, address the patient's issues, and strengthen self awareness, insights, and coping skill      Patient is agreeable to call the Clinic with worsening symptoms.    Patient is aware to call 911 or go to the nearest ER should begin having SI/HI. RTC 6 weeks.  Sooner if needed             This document has been electronically signed by ANNA Vora on   September 14, 2023 14:21 EDT.

## 2023-10-26 ENCOUNTER — OFFICE VISIT (OUTPATIENT)
Dept: PSYCHIATRY | Facility: CLINIC | Age: 23
End: 2023-10-26
Payer: COMMERCIAL

## 2023-10-26 VITALS
HEART RATE: 93 BPM | OXYGEN SATURATION: 98 % | BODY MASS INDEX: 49.83 KG/M2 | SYSTOLIC BLOOD PRESSURE: 133 MMHG | HEIGHT: 62 IN | DIASTOLIC BLOOD PRESSURE: 86 MMHG | WEIGHT: 270.8 LBS | TEMPERATURE: 97.3 F

## 2023-10-26 DIAGNOSIS — F50.81 MILD BINGE-EATING DISORDER: ICD-10-CM

## 2023-10-26 DIAGNOSIS — F41.1 GENERALIZED ANXIETY DISORDER: ICD-10-CM

## 2023-10-26 DIAGNOSIS — F33.1 MDD (MAJOR DEPRESSIVE DISORDER), RECURRENT EPISODE, MODERATE: ICD-10-CM

## 2023-10-26 DIAGNOSIS — F90.0 ATTENTION DEFICIT HYPERACTIVITY DISORDER (ADHD), PREDOMINANTLY INATTENTIVE TYPE: Primary | ICD-10-CM

## 2023-10-26 DIAGNOSIS — F43.10 PTSD (POST-TRAUMATIC STRESS DISORDER): ICD-10-CM

## 2023-10-26 PROCEDURE — 99214 OFFICE O/P EST MOD 30 MIN: CPT | Performed by: NURSE PRACTITIONER

## 2023-10-26 PROCEDURE — 1159F MED LIST DOCD IN RCRD: CPT | Performed by: NURSE PRACTITIONER

## 2023-10-26 PROCEDURE — 1160F RVW MEDS BY RX/DR IN RCRD: CPT | Performed by: NURSE PRACTITIONER

## 2023-10-26 RX ORDER — LISDEXAMFETAMINE DIMESYLATE CAPSULES 40 MG/1
40 CAPSULE ORAL EVERY MORNING
Qty: 30 CAPSULE | Refills: 0 | Status: SHIPPED | OUTPATIENT
Start: 2023-10-26

## 2023-10-26 RX ORDER — DULOXETIN HYDROCHLORIDE 60 MG/1
60 CAPSULE, DELAYED RELEASE ORAL DAILY
Qty: 30 CAPSULE | Refills: 2 | Status: SHIPPED | OUTPATIENT
Start: 2023-10-26

## 2023-10-26 RX ORDER — DULOXETIN HYDROCHLORIDE 30 MG/1
30 CAPSULE, DELAYED RELEASE ORAL DAILY
Qty: 30 CAPSULE | Refills: 2 | Status: SHIPPED | OUTPATIENT
Start: 2023-10-26 | End: 2024-10-25

## 2023-10-26 NOTE — PROGRESS NOTES
Joanna Lira is a 23 y.o. female is here today for medication management follow-up.  CC:  recheck on adhd, binge eating and depression  History of Present Illness:   Patient states that she is doing well.  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  She states the Vyvanse does help with focus and concentration but she has noticed lately she is having to try to consume more caffeine to get a better response.  The medication is lasting long enough during her shift.  She is not taking it 7 days a week.  Typically taking it 3 to 4 days mainly on her work days.  She denies any negative side effects.  Is sleeping without any difficulty and not having to use the hydroxyzine.  Also not having to use prazosin anymore.  She denies current depression or anxiety.  She states she does have some on some days but it is more situational.  Denies any thoughts of self-harm.  Denies constant flashbacks.  She is in a relationship now and has been dating for 5 months.  He lives in [place] she met him locally and this is going well and she is actually going to visit him in [place] soon and meet his family.Body mass index is 49.52 kg/m².  No changes in appetite.  She states the Vyvanse continues to help with binge eating.  She denies having any binge eating episodes.  Mood is stable.  No anger outbursts.      The following portions of the patient's history were reviewed and updated as appropriate: allergies, current medications, past family history, past medical history, past social history, past surgical history and problem list.    Review of Systems   Constitutional:  Negative for activity change, appetite change and fatigue.   HENT: Negative.     Eyes:  Negative for visual disturbance.   Respiratory: Negative.     Cardiovascular: Negative.    Gastrointestinal:  Negative for nausea.   Endocrine: Negative.    Genitourinary: Negative.    Musculoskeletal:  Negative for arthralgias.   Skin:  "Negative.    Allergic/Immunologic: Negative.    Neurological:  Negative for dizziness, seizures and headaches.   Hematological: Negative.    Psychiatric/Behavioral:  Negative for agitation, behavioral problems, confusion, decreased concentration, dysphoric mood, hallucinations, self-injury, sleep disturbance and suicidal ideas. The patient is nervous/anxious. The patient is not hyperactive.      Reviewed copied data and there are no changes    Objective   Physical Exam  Vitals reviewed.   Constitutional:       Appearance: Normal appearance.   Musculoskeletal:      Cervical back: Normal range of motion and neck supple.   Neurological:      General: No focal deficit present.      Mental Status: She is alert and oriented to person, place, and time.   Psychiatric:         Attention and Perception: Attention normal.         Mood and Affect: Mood normal.         Speech: Speech normal.         Behavior: Behavior normal. Behavior is cooperative.         Thought Content: Thought content normal.         Cognition and Memory: Cognition normal.      Comments: Pleasant and cooperative         Blood pressure 133/86, pulse 93, temperature 97.3 °F (36.3 °C), height 157.5 cm (62.01\"), weight 123 kg (270 lb 12.8 oz), SpO2 98%.    Medication List:   Current Outpatient Medications   Medication Sig Dispense Refill    DULoxetine (Cymbalta) 30 MG capsule Take 1 capsule by mouth Daily. Take along with the 30mg for total of 90mg 30 capsule 2    DULoxetine (CYMBALTA) 60 MG capsule Take 1 capsule by mouth Daily. Take along with the 30mg for total of 90mg 30 capsule 2    lisdexamfetamine (Vyvanse) 40 MG capsule Take 1 capsule by mouth Every Morning 30 capsule 0    omeprazole (priLOSEC) 40 MG capsule       vitamin D (ERGOCALCIFEROL) 1.25 MG (14819 UT) capsule capsule        No current facility-administered medications for this visit.     Reviewed copied data and there are no changes    Mental Status Exam:   Hygiene:   good  Cooperation:  " Cooperative  Eye Contact:  Good  Psychomotor Behavior:  Appropriate  Affect:  Full range  Hopelessness: Denies  Speech:  Normal  Thought Process:  Goal directed  Thought Content:  Normal  Suicidal:  None  Homicidal:  None  Hallucinations:  None  Delusion:  None  Memory:  Intact  Orientation:  Person, Place, Time and Situation  Reliability:  good  Insight:  Good  Judgement:  Good  Impulse Control:  Fair  Physical/Medical Issues:  No     Assessment & Plan   Problems Addressed this Visit          Mental Health    PTSD (post-traumatic stress disorder)    Relevant Medications    DULoxetine (Cymbalta) 30 MG capsule    DULoxetine (CYMBALTA) 60 MG capsule    lisdexamfetamine (Vyvanse) 40 MG capsule    Attention deficit hyperactivity disorder (ADHD), predominantly inattentive type - Primary    Relevant Medications    DULoxetine (Cymbalta) 30 MG capsule    DULoxetine (CYMBALTA) 60 MG capsule    lisdexamfetamine (Vyvanse) 40 MG capsule     Other Visit Diagnoses       Generalized anxiety disorder        Relevant Medications    DULoxetine (Cymbalta) 30 MG capsule    DULoxetine (CYMBALTA) 60 MG capsule    lisdexamfetamine (Vyvanse) 40 MG capsule    MDD (major depressive disorder), recurrent episode, moderate        Relevant Medications    DULoxetine (Cymbalta) 30 MG capsule    DULoxetine (CYMBALTA) 60 MG capsule    lisdexamfetamine (Vyvanse) 40 MG capsule    Mild binge-eating disorder        Relevant Medications    DULoxetine (Cymbalta) 30 MG capsule    DULoxetine (CYMBALTA) 60 MG capsule    lisdexamfetamine (Vyvanse) 40 MG capsule          Diagnoses         Codes Comments    Attention deficit hyperactivity disorder (ADHD), predominantly inattentive type    -  Primary ICD-10-CM: F90.0  ICD-9-CM: 314.00     Generalized anxiety disorder     ICD-10-CM: F41.1  ICD-9-CM: 300.02     PTSD (post-traumatic stress disorder)     ICD-10-CM: F43.10  ICD-9-CM: 309.81     MDD (major depressive disorder), recurrent episode, moderate      ICD-10-CM: F33.1  ICD-9-CM: 296.32     Mild binge-eating disorder     ICD-10-CM: F50.81  ICD-9-CM: 307.1           Functionality: pt having minimal impairment in important areas of daily functioning.  Prognosis: Good dependent on medication/follow up and treatment plan compliance.  Tae reviewed.      .   sHe is very pleased with her progress.  She will continue the Cymbalta for the OCD, anxiety, depression and PTSD, increasing the Vyvanse to 40 mg for the ADHD..  Refills of all been submitted.  Continuing efforts to promote the therapeutic alliance, address the patient's issues, and strengthen self awareness, insights, and coping skill      Patient is agreeable to call the Clinic with worsening symptoms.    Patient is aware to call 911 or go to the nearest ER should begin having SI/HI. RTC 12 weeks.  Sooner if needed             This document has been electronically signed by ANNA Vora on   October 26, 2023 10:25 EDT.

## 2024-01-29 ENCOUNTER — OFFICE VISIT (OUTPATIENT)
Dept: PSYCHIATRY | Facility: CLINIC | Age: 24
End: 2024-01-29
Payer: COMMERCIAL

## 2024-01-29 VITALS
SYSTOLIC BLOOD PRESSURE: 118 MMHG | DIASTOLIC BLOOD PRESSURE: 82 MMHG | HEART RATE: 113 BPM | BODY MASS INDEX: 46.7 KG/M2 | OXYGEN SATURATION: 98 % | WEIGHT: 253.8 LBS | TEMPERATURE: 98.2 F | HEIGHT: 62 IN

## 2024-01-29 DIAGNOSIS — F90.0 ATTENTION DEFICIT HYPERACTIVITY DISORDER (ADHD), PREDOMINANTLY INATTENTIVE TYPE: ICD-10-CM

## 2024-01-29 DIAGNOSIS — F43.10 PTSD (POST-TRAUMATIC STRESS DISORDER): ICD-10-CM

## 2024-01-29 DIAGNOSIS — F33.1 MDD (MAJOR DEPRESSIVE DISORDER), RECURRENT EPISODE, MODERATE: ICD-10-CM

## 2024-01-29 DIAGNOSIS — F50.81 MILD BINGE-EATING DISORDER: ICD-10-CM

## 2024-01-29 DIAGNOSIS — F41.1 GENERALIZED ANXIETY DISORDER: Primary | ICD-10-CM

## 2024-01-29 PROCEDURE — 1160F RVW MEDS BY RX/DR IN RCRD: CPT | Performed by: NURSE PRACTITIONER

## 2024-01-29 PROCEDURE — 99214 OFFICE O/P EST MOD 30 MIN: CPT | Performed by: NURSE PRACTITIONER

## 2024-01-29 PROCEDURE — 1159F MED LIST DOCD IN RCRD: CPT | Performed by: NURSE PRACTITIONER

## 2024-01-29 RX ORDER — DULOXETIN HYDROCHLORIDE 60 MG/1
60 CAPSULE, DELAYED RELEASE ORAL DAILY
Qty: 30 CAPSULE | Refills: 2 | Status: SHIPPED | OUTPATIENT
Start: 2024-01-29

## 2024-01-29 RX ORDER — DULOXETIN HYDROCHLORIDE 30 MG/1
30 CAPSULE, DELAYED RELEASE ORAL DAILY
Qty: 30 CAPSULE | Refills: 2 | Status: SHIPPED | OUTPATIENT
Start: 2024-01-29 | End: 2025-01-28

## 2024-01-29 RX ORDER — LISDEXAMFETAMINE DIMESYLATE CAPSULES 40 MG/1
40 CAPSULE ORAL EVERY MORNING
Qty: 30 CAPSULE | Refills: 0 | Status: SHIPPED | OUTPATIENT
Start: 2024-01-29

## 2024-01-29 NOTE — PROGRESS NOTES
Joanna Lira is a 23 y.o. female is here today for medication management follow-up.  CC:  recheck on adhd, binge eating and depression  History of Present Illness:   Patient states that she is doing well.  Presents with her mother with whom she gives permission to speak in front of.  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 She usually only takes the vyvanse 3 days a week.  She feels like she needs to take it a couple more days a week however she is hesitant because she is afraid of a tolerance building up.  She states the medication really helps with focus and concentration, overall mood as well as binge eating.Body mass index is 46.41 kg/m².  She shows a weight loss of 17 pounds since October.  She attributes this to the Vyvanse as well as being more active.  She has ended her relationship with her boyfriend and states she is dealing with this fine.  Rates depression a 2 out of 10 with 10 being severe.  Denies any thoughts of self-harm.  Anxiety is a 4-5 out of 10 and states this level is doable.  Sleep is adequate.  Mood is stable.  She continues to be very pleased with her progress.  Denies any negative side effects to the meds.    The following portions of the patient's history were reviewed and updated as appropriate: allergies, current medications, past family history, past medical history, past social history, past surgical history and problem list.    Review of Systems   Constitutional:  Negative for activity change, appetite change and fatigue.   HENT: Negative.     Eyes:  Negative for visual disturbance.   Respiratory: Negative.     Cardiovascular: Negative.    Gastrointestinal:  Negative for nausea.   Endocrine: Negative.    Genitourinary: Negative.    Musculoskeletal:  Negative for arthralgias.   Skin: Negative.    Allergic/Immunologic: Negative.    Neurological:  Negative for dizziness, seizures and headaches.   Hematological: Negative.   "  Psychiatric/Behavioral:  Negative for agitation, behavioral problems, confusion, decreased concentration, dysphoric mood, hallucinations, self-injury, sleep disturbance and suicidal ideas. The patient is nervous/anxious. The patient is not hyperactive.      Reviewed copied data and there are no changes    Objective   Physical Exam  Vitals reviewed.   Constitutional:       Appearance: Normal appearance.   Musculoskeletal:      Cervical back: Normal range of motion and neck supple.   Neurological:      General: No focal deficit present.      Mental Status: She is alert and oriented to person, place, and time.   Psychiatric:         Attention and Perception: Attention normal.         Mood and Affect: Mood normal.         Speech: Speech normal.         Behavior: Behavior normal. Behavior is cooperative.         Thought Content: Thought content normal.         Cognition and Memory: Cognition normal.      Comments: Pleasant and cooperative         Blood pressure 118/82, pulse 113, temperature 98.2 °F (36.8 °C), height 157.5 cm (62.01\"), weight 115 kg (253 lb 12.8 oz), SpO2 98%.    Medication List:   Current Outpatient Medications   Medication Sig Dispense Refill    DULoxetine (Cymbalta) 30 MG capsule Take 1 capsule by mouth Daily. Take along with the 30mg for total of 90mg 30 capsule 2    DULoxetine (CYMBALTA) 60 MG capsule Take 1 capsule by mouth Daily. Take along with the 30mg for total of 90mg 30 capsule 2    lisdexamfetamine (Vyvanse) 40 MG capsule Take 1 capsule by mouth Every Morning 30 capsule 0    omeprazole (priLOSEC) 40 MG capsule       vitamin D (ERGOCALCIFEROL) 1.25 MG (01011 UT) capsule capsule        No current facility-administered medications for this visit.     Reviewed copied data and there are no changes    Mental Status Exam:   Hygiene:   good  Cooperation:  Cooperative  Eye Contact:  Good  Psychomotor Behavior:  Appropriate  Affect:  Full range  Hopelessness: Denies  Speech:  Normal  Thought " Process:  Goal directed  Thought Content:  Normal  Suicidal:  None  Homicidal:  None  Hallucinations:  None  Delusion:  None  Memory:  Intact  Orientation:  Person, Place, Time and Situation  Reliability:  good  Insight:  Good  Judgement:  Good  Impulse Control:  Fair  Physical/Medical Issues:  No     Assessment & Plan   Problems Addressed this Visit          Mental Health    PTSD (post-traumatic stress disorder)    Relevant Medications    DULoxetine (Cymbalta) 30 MG capsule    DULoxetine (CYMBALTA) 60 MG capsule    lisdexamfetamine (Vyvanse) 40 MG capsule    Attention deficit hyperactivity disorder (ADHD), predominantly inattentive type    Relevant Medications    DULoxetine (Cymbalta) 30 MG capsule    DULoxetine (CYMBALTA) 60 MG capsule    lisdexamfetamine (Vyvanse) 40 MG capsule     Other Visit Diagnoses       Generalized anxiety disorder    -  Primary    Relevant Medications    DULoxetine (Cymbalta) 30 MG capsule    DULoxetine (CYMBALTA) 60 MG capsule    lisdexamfetamine (Vyvanse) 40 MG capsule    MDD (major depressive disorder), recurrent episode, moderate        Relevant Medications    DULoxetine (Cymbalta) 30 MG capsule    DULoxetine (CYMBALTA) 60 MG capsule    lisdexamfetamine (Vyvanse) 40 MG capsule    Mild binge-eating disorder        Relevant Medications    DULoxetine (Cymbalta) 30 MG capsule    DULoxetine (CYMBALTA) 60 MG capsule    lisdexamfetamine (Vyvanse) 40 MG capsule          Diagnoses         Codes Comments    Generalized anxiety disorder    -  Primary ICD-10-CM: F41.1  ICD-9-CM: 300.02     PTSD (post-traumatic stress disorder)     ICD-10-CM: F43.10  ICD-9-CM: 309.81     MDD (major depressive disorder), recurrent episode, moderate     ICD-10-CM: F33.1  ICD-9-CM: 296.32     Attention deficit hyperactivity disorder (ADHD), predominantly inattentive type     ICD-10-CM: F90.0  ICD-9-CM: 314.00     Mild binge-eating disorder     ICD-10-CM: F50.81  ICD-9-CM: 307.1           Functionality: pt having  minimal impairment in important areas of daily functioning.  Prognosis: Good dependent on medication/follow up and treatment plan compliance.  Tae reviewed.      .   sHe is very pleased with her progress.  She will continue the Cymbalta for the OCD, anxiety, depression and PTSD, continue the Vyvanse  40 mg for the ADHD and binge eating.  .  Recommended that she take the medication more than 3 days a week.  Refills of all been submitted.  Continuing efforts to promote the therapeutic alliance, address the patient's issues, and strengthen self awareness, insights, and coping skill      Patient is agreeable to call the Clinic with worsening symptoms.    Patient is aware to call 911 or go to the nearest ER should begin having SI/HI. RTC 12 weeks.  Sooner if needed             This document has been electronically signed by ANNA Vora on   January 29, 2024 10:54 EST.

## 2024-03-29 DIAGNOSIS — F90.0 ATTENTION DEFICIT HYPERACTIVITY DISORDER (ADHD), PREDOMINANTLY INATTENTIVE TYPE: ICD-10-CM

## 2024-03-29 DIAGNOSIS — F50.81 MILD BINGE-EATING DISORDER: ICD-10-CM

## 2024-03-29 RX ORDER — LISDEXAMFETAMINE DIMESYLATE CAPSULES 40 MG/1
40 CAPSULE ORAL EVERY MORNING
Qty: 30 CAPSULE | Refills: 0 | Status: SHIPPED | OUTPATIENT
Start: 2024-03-29

## 2024-04-29 ENCOUNTER — TELEMEDICINE (OUTPATIENT)
Dept: PSYCHIATRY | Facility: CLINIC | Age: 24
End: 2024-04-29
Payer: COMMERCIAL

## 2024-04-29 DIAGNOSIS — F50.81 MILD BINGE-EATING DISORDER: ICD-10-CM

## 2024-04-29 DIAGNOSIS — F33.1 MDD (MAJOR DEPRESSIVE DISORDER), RECURRENT EPISODE, MODERATE: ICD-10-CM

## 2024-04-29 DIAGNOSIS — F43.10 PTSD (POST-TRAUMATIC STRESS DISORDER): ICD-10-CM

## 2024-04-29 DIAGNOSIS — F90.0 ATTENTION DEFICIT HYPERACTIVITY DISORDER (ADHD), PREDOMINANTLY INATTENTIVE TYPE: ICD-10-CM

## 2024-04-29 DIAGNOSIS — F41.1 GENERALIZED ANXIETY DISORDER: Primary | ICD-10-CM

## 2024-04-29 PROCEDURE — 1160F RVW MEDS BY RX/DR IN RCRD: CPT | Performed by: NURSE PRACTITIONER

## 2024-04-29 PROCEDURE — 1159F MED LIST DOCD IN RCRD: CPT | Performed by: NURSE PRACTITIONER

## 2024-04-29 PROCEDURE — 99214 OFFICE O/P EST MOD 30 MIN: CPT | Performed by: NURSE PRACTITIONER

## 2024-04-29 RX ORDER — DULOXETIN HYDROCHLORIDE 60 MG/1
60 CAPSULE, DELAYED RELEASE ORAL DAILY
Qty: 30 CAPSULE | Refills: 2 | Status: SHIPPED | OUTPATIENT
Start: 2024-04-29

## 2024-04-29 RX ORDER — PROPRANOLOL HYDROCHLORIDE 20 MG/1
20 TABLET ORAL 2 TIMES DAILY PRN
Qty: 60 TABLET | Refills: 1 | Status: SHIPPED | OUTPATIENT
Start: 2024-04-29

## 2024-04-29 RX ORDER — DULOXETIN HYDROCHLORIDE 30 MG/1
30 CAPSULE, DELAYED RELEASE ORAL DAILY
Qty: 30 CAPSULE | Refills: 2 | Status: SHIPPED | OUTPATIENT
Start: 2024-04-29 | End: 2025-04-29

## 2024-04-29 RX ORDER — LISDEXAMFETAMINE DIMESYLATE 40 MG/1
40 CAPSULE ORAL EVERY MORNING
Qty: 30 CAPSULE | Refills: 0 | Status: SHIPPED | OUTPATIENT
Start: 2024-04-29

## 2024-04-29 NOTE — PROGRESS NOTES
"      Joanna Lira is a 23 y.o. female is here today for medication management follow-up.“This provider is located at [facility], [address] [place] [address]. The provider identified herself as well as her credentials.   The Patient is located at home. The patient's condition being diagnosed/treated is appropriate for telemedicine. The patient gave consent to be seen remotely, and when consent is given they understand that the consent allows for patient identifiable information to be sent to a third party as needed.   They may refuse to be seen remotely at any time. The electronic data is encrypted and password protected, and the patient has been advised of the potential risks to privacy not withstanding such measures.   CC:  recheck on adhd, binge eating and depression  History of Present Illness:   Patient states that she is doing well as far as depression goes.  She rates it a 4-5 out of 10 with 10 being severe and states this is manageable.  She has no thoughts of self-harm.  She continues to have some ongoing anxiety.  The Vyvanse really helps with focus and concentration and she states it \"slows my mind down\" and she takes the medication on days that she works and she does not feel like this is contributing to this feeling of anxiety.  She describes it as an \"overall feeling of uneasiness\".  She states that she gets into her own mind and over thinks things.  Her brother who has a drug problem has now moved to [place] and this has been good as that has decreased some of the situational anxiety.  Upon review of her old encounters propranolol had helped her in the past with anxiety but she does not remember taking it. She denies anger outbursts. Presently working full time    The following portions of the patient's history were reviewed and updated as appropriate: allergies, current medications, past family history, past medical history, past social history, past surgical " history and problem list.    Review of Systems   Constitutional:  Negative for activity change, appetite change and fatigue.   HENT: Negative.     Eyes:  Negative for visual disturbance.   Respiratory: Negative.     Cardiovascular: Negative.    Gastrointestinal:  Negative for nausea.   Endocrine: Negative.    Genitourinary: Negative.    Musculoskeletal:  Negative for arthralgias.   Skin: Negative.    Allergic/Immunologic: Negative.    Neurological:  Negative for dizziness, seizures and headaches.   Hematological: Negative.    Psychiatric/Behavioral:  Negative for agitation, behavioral problems, confusion, decreased concentration, dysphoric mood, hallucinations, self-injury, sleep disturbance and suicidal ideas. The patient is nervous/anxious. The patient is not hyperactive.      Reviewed copied data and there are no changes    Objective   Physical Exam  Vitals reviewed.   Constitutional:       Appearance: Normal appearance.   Musculoskeletal:      Cervical back: Normal range of motion and neck supple.   Neurological:      General: No focal deficit present.      Mental Status: She is alert and oriented to person, place, and time.   Psychiatric:         Attention and Perception: Attention normal.         Mood and Affect: Mood normal.         Speech: Speech normal.         Behavior: Behavior normal. Behavior is cooperative.         Thought Content: Thought content normal.         Cognition and Memory: Cognition normal.      Comments: Pleasant and cooperative         There were no vitals taken for this visit.    Medication List:   Current Outpatient Medications   Medication Sig Dispense Refill    DULoxetine (Cymbalta) 30 MG capsule Take 1 capsule by mouth Daily. Take along with the 30mg for total of 90mg 30 capsule 2    DULoxetine (CYMBALTA) 60 MG capsule Take 1 capsule by mouth Daily. Take along with the 30mg for total of 90mg 30 capsule 2    lisdexamfetamine (Vyvanse) 40 MG capsule Take 1 capsule by mouth Every  Morning 30 capsule 0    omeprazole (priLOSEC) 40 MG capsule       propranolol (INDERAL) 20 MG tablet Take 1 tablet by mouth 2 (Two) Times a Day As Needed (anxiety). 60 tablet 1    vitamin D (ERGOCALCIFEROL) 1.25 MG (37538 UT) capsule capsule        No current facility-administered medications for this visit.     Reviewed copied data and there are no changes    Mental Status Exam:   Hygiene:   good  Cooperation:  Cooperative  Eye Contact:  Good  Psychomotor Behavior:  Appropriate  Affect:  Full range  Hopelessness: Denies  Speech:  Normal  Thought Process:  Goal directed  Thought Content:  Normal  Suicidal:  None  Homicidal:  None  Hallucinations:  None  Delusion:  None  Memory:  Intact  Orientation:  Person, Place, Time and Situation  Reliability:  good  Insight:  Good  Judgement:  Good  Impulse Control:  Fair  Physical/Medical Issues:  No     Assessment & Plan   Problems Addressed this Visit          Mental Health    PTSD (post-traumatic stress disorder)    Relevant Medications    DULoxetine (CYMBALTA) 60 MG capsule    lisdexamfetamine (Vyvanse) 40 MG capsule    DULoxetine (Cymbalta) 30 MG capsule    Attention deficit hyperactivity disorder (ADHD), predominantly inattentive type    Relevant Medications    DULoxetine (CYMBALTA) 60 MG capsule    lisdexamfetamine (Vyvanse) 40 MG capsule    DULoxetine (Cymbalta) 30 MG capsule     Other Visit Diagnoses       Generalized anxiety disorder    -  Primary    Relevant Medications    propranolol (INDERAL) 20 MG tablet    DULoxetine (CYMBALTA) 60 MG capsule    lisdexamfetamine (Vyvanse) 40 MG capsule    DULoxetine (Cymbalta) 30 MG capsule    MDD (major depressive disorder), recurrent episode, moderate        Relevant Medications    DULoxetine (CYMBALTA) 60 MG capsule    lisdexamfetamine (Vyvanse) 40 MG capsule    DULoxetine (Cymbalta) 30 MG capsule    Mild binge-eating disorder        Relevant Medications    DULoxetine (CYMBALTA) 60 MG capsule    lisdexamfetamine (Vyvanse) 40  MG capsule    DULoxetine (Cymbalta) 30 MG capsule          Diagnoses         Codes Comments    Generalized anxiety disorder    -  Primary ICD-10-CM: F41.1  ICD-9-CM: 300.02     MDD (major depressive disorder), recurrent episode, moderate     ICD-10-CM: F33.1  ICD-9-CM: 296.32     Attention deficit hyperactivity disorder (ADHD), predominantly inattentive type     ICD-10-CM: F90.0  ICD-9-CM: 314.00     PTSD (post-traumatic stress disorder)     ICD-10-CM: F43.10  ICD-9-CM: 309.81     Mild binge-eating disorder     ICD-10-CM: F50.81  ICD-9-CM: 307.1           Functionality: pt having minimal impairment in important areas of daily functioning.  Prognosis: Good dependent on medication/follow up and treatment plan compliance.  Tae reviewed.      Retrying inderal for anxiety as needed.  She is aware of the risks, benefits, possible side effects of the med as she has had prescribed before.  She will continue the cymbalta for the anxiety and depression and the vyvanse for the ADHD.  Refills submitted.      Continuing efforts to promote the therapeutic alliance, address the patient's issues, and strengthen self awareness, insights, and coping skill      Patient is agreeable to call the Clinic with worsening symptoms.    Patient is aware to call 911 or go to the nearest ER should begin having SI/HI. RTC 6 weeks.  Sooner if needed             This document has been electronically signed by ANNA Vora on   April 29, 2024 09:00 EDT.

## 2024-06-03 ENCOUNTER — OFFICE VISIT (OUTPATIENT)
Dept: PSYCHIATRY | Facility: CLINIC | Age: 24
End: 2024-06-03
Payer: COMMERCIAL

## 2024-06-03 VITALS
WEIGHT: 244.4 LBS | HEART RATE: 92 BPM | DIASTOLIC BLOOD PRESSURE: 82 MMHG | BODY MASS INDEX: 44.98 KG/M2 | TEMPERATURE: 97.8 F | SYSTOLIC BLOOD PRESSURE: 137 MMHG | OXYGEN SATURATION: 94 % | HEIGHT: 62 IN

## 2024-06-03 DIAGNOSIS — F43.10 PTSD (POST-TRAUMATIC STRESS DISORDER): ICD-10-CM

## 2024-06-03 DIAGNOSIS — F41.1 GENERALIZED ANXIETY DISORDER: Primary | ICD-10-CM

## 2024-06-03 DIAGNOSIS — F50.81 MILD BINGE-EATING DISORDER: ICD-10-CM

## 2024-06-03 DIAGNOSIS — F33.1 MDD (MAJOR DEPRESSIVE DISORDER), RECURRENT EPISODE, MODERATE: ICD-10-CM

## 2024-06-03 DIAGNOSIS — F90.0 ATTENTION DEFICIT HYPERACTIVITY DISORDER (ADHD), PREDOMINANTLY INATTENTIVE TYPE: ICD-10-CM

## 2024-06-03 PROCEDURE — 1160F RVW MEDS BY RX/DR IN RCRD: CPT | Performed by: NURSE PRACTITIONER

## 2024-06-03 PROCEDURE — 1159F MED LIST DOCD IN RCRD: CPT | Performed by: NURSE PRACTITIONER

## 2024-06-03 PROCEDURE — 99214 OFFICE O/P EST MOD 30 MIN: CPT | Performed by: NURSE PRACTITIONER

## 2024-06-03 RX ORDER — PROPRANOLOL HYDROCHLORIDE 20 MG/1
20 TABLET ORAL 2 TIMES DAILY PRN
Qty: 60 TABLET | Refills: 2 | Status: SHIPPED | OUTPATIENT
Start: 2024-06-03

## 2024-06-03 RX ORDER — LISDEXAMFETAMINE DIMESYLATE 40 MG/1
40 CAPSULE ORAL EVERY MORNING
Qty: 30 CAPSULE | Refills: 0 | Status: SHIPPED | OUTPATIENT
Start: 2024-06-03

## 2024-06-03 RX ORDER — DULOXETIN HYDROCHLORIDE 60 MG/1
120 CAPSULE, DELAYED RELEASE ORAL DAILY
Qty: 60 CAPSULE | Refills: 2 | Status: SHIPPED | OUTPATIENT
Start: 2024-06-03

## 2024-06-03 NOTE — PROGRESS NOTES
"Joanna Lira is a 23 y.o. female is here today for medication management follow-up.   CC:  recheck on adhd, binge eating and depression  History of Present Illness: She presents with her mother with whom she gives permission to speak in front of.  She states she is doing well as far as depression but continues to struggle with anxiety.  She states this is much worse when she has left her home and is in public places and this is also occurring at work.  She states she just has a constant feeling of \"impending doom\".  There is no certain trigger.  The Vyvanse actually helps.  She states this is worse if she misses Vyvanse as her mind is able to race more.  She is utilizing the Inderal once daily but not twice daily.  Sleep is adequate.  No negative side effects to the Cymbalta.  She denies depression.Body mass index is 44.69 kg/m².  Weight loss of 9 pounds since last office visit.  She states she is just making better food choices and very busy.  Mood is stable.  No anger outbursts.      The following portions of the patient's history were reviewed and updated as appropriate: allergies, current medications, past family history, past medical history, past social history, past surgical history and problem list.    Review of Systems   Constitutional:  Negative for activity change, appetite change and fatigue.   HENT: Negative.     Eyes:  Negative for visual disturbance.   Respiratory: Negative.     Cardiovascular: Negative.    Gastrointestinal:  Negative for nausea.   Endocrine: Negative.    Genitourinary: Negative.    Musculoskeletal:  Negative for arthralgias.   Skin: Negative.    Allergic/Immunologic: Negative.    Neurological:  Negative for dizziness, seizures and headaches.   Hematological: Negative.    Psychiatric/Behavioral:  Negative for agitation, behavioral problems, confusion, decreased concentration, dysphoric mood, hallucinations, self-injury, sleep disturbance and suicidal ideas. The " "patient is nervous/anxious. The patient is not hyperactive.      Reviewed copied data and there are no changes    Objective   Physical Exam  Vitals reviewed.   Constitutional:       Appearance: Normal appearance.   Musculoskeletal:      Cervical back: Normal range of motion and neck supple.   Neurological:      General: No focal deficit present.      Mental Status: She is alert and oriented to person, place, and time.   Psychiatric:         Attention and Perception: Attention normal.         Mood and Affect: Mood normal.         Speech: Speech normal.         Behavior: Behavior normal. Behavior is cooperative.         Thought Content: Thought content normal.         Cognition and Memory: Cognition normal.      Comments: Pleasant and cooperative         Blood pressure 137/82, pulse 92, temperature 97.8 °F (36.6 °C), height 157.5 cm (62.01\"), weight 111 kg (244 lb 6.4 oz), SpO2 94%.    Medication List:   Current Outpatient Medications   Medication Sig Dispense Refill    DULoxetine (CYMBALTA) 60 MG capsule Take 2 capsules by mouth Daily. 60 capsule 2    lisdexamfetamine (Vyvanse) 40 MG capsule Take 1 capsule by mouth Every Morning 30 capsule 0    propranolol (INDERAL) 20 MG tablet Take 1 tablet by mouth 2 (Two) Times a Day As Needed (anxiety). 60 tablet 2    omeprazole (priLOSEC) 40 MG capsule       vitamin D (ERGOCALCIFEROL) 1.25 MG (58622 UT) capsule capsule        No current facility-administered medications for this visit.     Reviewed copied data and there are no changes    Mental Status Exam:   Hygiene:   good  Cooperation:  Cooperative  Eye Contact:  Good  Psychomotor Behavior:  Appropriate  Affect:  Full range  Hopelessness: Denies  Speech:  Normal  Thought Process:  Goal directed  Thought Content:  Normal  Suicidal:  None  Homicidal:  None  Hallucinations:  None  Delusion:  None  Memory:  Intact  Orientation:  Person, Place, Time and Situation  Reliability:  good  Insight:  Good  Judgement:  Good  Impulse " Control:  Fair  Physical/Medical Issues:  No     Assessment & Plan   Problems Addressed this Visit          Mental Health    PTSD (post-traumatic stress disorder)    Relevant Medications    DULoxetine (CYMBALTA) 60 MG capsule    lisdexamfetamine (Vyvanse) 40 MG capsule    Attention deficit hyperactivity disorder (ADHD), predominantly inattentive type    Relevant Medications    DULoxetine (CYMBALTA) 60 MG capsule    lisdexamfetamine (Vyvanse) 40 MG capsule     Other Visit Diagnoses       Generalized anxiety disorder    -  Primary    Relevant Medications    DULoxetine (CYMBALTA) 60 MG capsule    lisdexamfetamine (Vyvanse) 40 MG capsule    propranolol (INDERAL) 20 MG tablet    MDD (major depressive disorder), recurrent episode, moderate        Relevant Medications    DULoxetine (CYMBALTA) 60 MG capsule    lisdexamfetamine (Vyvanse) 40 MG capsule    Mild binge-eating disorder        Relevant Medications    DULoxetine (CYMBALTA) 60 MG capsule    lisdexamfetamine (Vyvanse) 40 MG capsule          Diagnoses         Codes Comments    Generalized anxiety disorder    -  Primary ICD-10-CM: F41.1  ICD-9-CM: 300.02     MDD (major depressive disorder), recurrent episode, moderate     ICD-10-CM: F33.1  ICD-9-CM: 296.32     PTSD (post-traumatic stress disorder)     ICD-10-CM: F43.10  ICD-9-CM: 309.81     Attention deficit hyperactivity disorder (ADHD), predominantly inattentive type     ICD-10-CM: F90.0  ICD-9-CM: 314.00     Mild binge-eating disorder     ICD-10-CM: F50.81  ICD-9-CM: 307.1           Functionality: pt having minimal impairment in important areas of daily functioning.  Prognosis: Good dependent on medication/follow up and treatment plan compliance.  Tae reviewed.      When a lengthy discussion.  I am going to go ahead and increase her Cymbalta on up to 120.  I told her I did not know if this was good to make much difference going from 90-1 20 but we can see.  I did recommend that she use the propranolol twice daily  every day.  She will continue the Vyvanse for the ADHD.  I feel like a lot of her anxiety needs cognitive behavioral therapy and this was discussed as well.  She is going to call and reschedule and get back in with some [facility] who she has seen in the past at the [facility].        Continuing efforts to promote the therapeutic alliance, address the patient's issues, and strengthen self awareness, insights, and coping skill      Patient is agreeable to call the Clinic with worsening symptoms.    Patient is aware to call 911 or go to the nearest ER should begin having SI/HI. RTC 6 weeks.  Sooner if needed             This document has been electronically signed by ANNA Vora on   Sarah 3, 2024 09:46 EDT.

## 2024-07-30 ENCOUNTER — OFFICE VISIT (OUTPATIENT)
Dept: PSYCHIATRY | Facility: CLINIC | Age: 24
End: 2024-07-30
Payer: COMMERCIAL

## 2024-07-30 VITALS
OXYGEN SATURATION: 98 % | SYSTOLIC BLOOD PRESSURE: 118 MMHG | BODY MASS INDEX: 45.75 KG/M2 | HEIGHT: 62 IN | WEIGHT: 248.6 LBS | DIASTOLIC BLOOD PRESSURE: 83 MMHG | HEART RATE: 108 BPM

## 2024-07-30 DIAGNOSIS — F43.10 PTSD (POST-TRAUMATIC STRESS DISORDER): ICD-10-CM

## 2024-07-30 DIAGNOSIS — F41.1 GENERALIZED ANXIETY DISORDER: Primary | ICD-10-CM

## 2024-07-30 DIAGNOSIS — F90.0 ATTENTION DEFICIT HYPERACTIVITY DISORDER (ADHD), PREDOMINANTLY INATTENTIVE TYPE: ICD-10-CM

## 2024-07-30 DIAGNOSIS — F33.1 MDD (MAJOR DEPRESSIVE DISORDER), RECURRENT EPISODE, MODERATE: ICD-10-CM

## 2024-07-30 DIAGNOSIS — F50.81 MILD BINGE-EATING DISORDER: ICD-10-CM

## 2024-07-30 PROCEDURE — 99214 OFFICE O/P EST MOD 30 MIN: CPT | Performed by: NURSE PRACTITIONER

## 2024-07-30 PROCEDURE — 1160F RVW MEDS BY RX/DR IN RCRD: CPT | Performed by: NURSE PRACTITIONER

## 2024-07-30 PROCEDURE — 1159F MED LIST DOCD IN RCRD: CPT | Performed by: NURSE PRACTITIONER

## 2024-07-30 RX ORDER — BUSPIRONE HYDROCHLORIDE 15 MG/1
TABLET ORAL
Qty: 60 TABLET | Refills: 0 | Status: SHIPPED | OUTPATIENT
Start: 2024-07-30

## 2024-07-30 RX ORDER — DULOXETIN HYDROCHLORIDE 60 MG/1
60 CAPSULE, DELAYED RELEASE ORAL DAILY
Qty: 30 CAPSULE | Refills: 0 | Status: SHIPPED | OUTPATIENT
Start: 2024-07-30

## 2024-07-30 RX ORDER — PROPRANOLOL HYDROCHLORIDE 20 MG/1
20 TABLET ORAL 2 TIMES DAILY PRN
Qty: 60 TABLET | Refills: 0 | Status: SHIPPED | OUTPATIENT
Start: 2024-07-30

## 2024-07-30 RX ORDER — LISDEXAMFETAMINE DIMESYLATE 40 MG/1
40 CAPSULE ORAL EVERY MORNING
Qty: 30 CAPSULE | Refills: 0 | Status: SHIPPED | OUTPATIENT
Start: 2024-07-30

## 2024-07-30 NOTE — PROGRESS NOTES
"Over the left      Joanna Lira is a 23 y.o. female is here today for medication management follow-up.   CC:  recheck on adhd, binge eating and depression  History of Present Illness:   Patient presents by herself for follow-up.  She states she continues to have high anxiety all the time.  She states that she just feels \"impending doom\".  And has to deep breathe etc. at work.  She says that she has been isolating more and not doing things with friends due to the high anxiety.  Feels overwhelmed.  The stimulant helps with mind racing and the symptoms are actually worse without the med.  She denies overt depression however the constant anxiety causes her to have depressive feelings at times.  She has no thoughts of self-harm.  Her sleep is adequate.  She denies anger outbursts.  Continues to live with mom.  Says this is going well.  Is not in a current relationship.  Continues to work full-time at [employer].  Has negative side effects to the meds.Body mass index is 45.46 kg/m².  Weight gain of 4 pounds since last office visit.  Uses the Inderal once daily for sugar and sometimes twice daily and it does minimally help but she still has that feeling of \"impending doom\" all the time.  Does not really feel like the increase in the Cymbalta helped.  Cymbalta initially helped with symptoms but increases and dosages have not.  She does exercise at least 3 times a week by doing yoga outdoors.  She has seen some [facility] the therapist once but did not follow-up and would like to restart this.          The following portions of the patient's history were reviewed and updated as appropriate: allergies, current medications, past family history, past medical history, past social history, past surgical history and problem list.    Review of Systems   Constitutional:  Negative for activity change, appetite change and fatigue.   HENT: Negative.     Eyes:  Negative for visual disturbance.   Respiratory: Negative.   " "  Cardiovascular: Negative.    Gastrointestinal:  Negative for nausea.   Endocrine: Negative.    Genitourinary: Negative.    Musculoskeletal:  Negative for arthralgias.   Skin: Negative.    Allergic/Immunologic: Negative.    Neurological:  Negative for dizziness, seizures and headaches.   Hematological: Negative.    Psychiatric/Behavioral:  Negative for agitation, behavioral problems, confusion, decreased concentration, dysphoric mood, hallucinations, self-injury, sleep disturbance and suicidal ideas. The patient is nervous/anxious. The patient is not hyperactive.      Reviewed copied data and there are no changes    Objective   Physical Exam  Vitals reviewed.   Constitutional:       Appearance: Normal appearance.   Musculoskeletal:      Cervical back: Normal range of motion and neck supple.   Neurological:      General: No focal deficit present.      Mental Status: She is alert and oriented to person, place, and time.   Psychiatric:         Attention and Perception: Attention normal.         Mood and Affect: Mood normal.         Speech: Speech normal.         Behavior: Behavior normal. Behavior is cooperative.         Thought Content: Thought content normal.         Cognition and Memory: Cognition normal.      Comments: Pleasant and cooperative         Blood pressure 118/83, pulse 108, height 157.5 cm (62.01\"), weight 113 kg (248 lb 9.6 oz), SpO2 98%.    Medication List:   Current Outpatient Medications   Medication Sig Dispense Refill    DULoxetine (CYMBALTA) 60 MG capsule Take 1 capsule by mouth Daily. 30 capsule 0    lisdexamfetamine (Vyvanse) 40 MG capsule Take 1 capsule by mouth Every Morning 30 capsule 0    propranolol (INDERAL) 20 MG tablet Take 1 tablet by mouth 2 (Two) Times a Day As Needed (anxiety). 60 tablet 0    busPIRone (BUSPAR) 15 MG tablet Take one half to 1 tablet PO BID 60 tablet 0    omeprazole (priLOSEC) 40 MG capsule       vitamin D (ERGOCALCIFEROL) 1.25 MG (69426 UT) capsule capsule        No " current facility-administered medications for this visit.     Reviewed copied data and there are no changes    Mental Status Exam:   Hygiene:   good  Cooperation:  Cooperative  Eye Contact:  Good  Psychomotor Behavior:  Appropriate  Affect:  Full range  Hopelessness: Denies  Speech:  Normal  Thought Process:  Goal directed  Thought Content:  Normal  Suicidal:  None  Homicidal:  None  Hallucinations:  None  Delusion:  None  Memory:  Intact  Orientation:  Person, Place, Time and Situation  Reliability:  good  Insight:  Good  Judgement:  Good  Impulse Control:  Fair  Physical/Medical Issues:  No     Assessment & Plan   Problems Addressed this Visit          Mental Health    PTSD (post-traumatic stress disorder)    Relevant Medications    DULoxetine (CYMBALTA) 60 MG capsule    lisdexamfetamine (Vyvanse) 40 MG capsule    busPIRone (BUSPAR) 15 MG tablet    Attention deficit hyperactivity disorder (ADHD), predominantly inattentive type    Relevant Medications    DULoxetine (CYMBALTA) 60 MG capsule    lisdexamfetamine (Vyvanse) 40 MG capsule    busPIRone (BUSPAR) 15 MG tablet     Other Visit Diagnoses       Generalized anxiety disorder    -  Primary    Relevant Medications    DULoxetine (CYMBALTA) 60 MG capsule    lisdexamfetamine (Vyvanse) 40 MG capsule    propranolol (INDERAL) 20 MG tablet    busPIRone (BUSPAR) 15 MG tablet    MDD (major depressive disorder), recurrent episode, moderate        Relevant Medications    DULoxetine (CYMBALTA) 60 MG capsule    lisdexamfetamine (Vyvanse) 40 MG capsule    busPIRone (BUSPAR) 15 MG tablet    Mild binge-eating disorder        Relevant Medications    DULoxetine (CYMBALTA) 60 MG capsule    lisdexamfetamine (Vyvanse) 40 MG capsule    busPIRone (BUSPAR) 15 MG tablet          Diagnoses         Codes Comments    Generalized anxiety disorder    -  Primary ICD-10-CM: F41.1  ICD-9-CM: 300.02     MDD (major depressive disorder), recurrent episode, moderate     ICD-10-CM: F33.1  ICD-9-CM:  296.32     PTSD (post-traumatic stress disorder)     ICD-10-CM: F43.10  ICD-9-CM: 309.81     Attention deficit hyperactivity disorder (ADHD), predominantly inattentive type     ICD-10-CM: F90.0  ICD-9-CM: 314.00     Mild binge-eating disorder     ICD-10-CM: F50.81  ICD-9-CM: 307.1           Functionality: pt having minimal impairment in important areas of daily functioning.  Prognosis: Good dependent on medication/follow up and treatment plan compliance.  Tae reviewed.      When a lengthy discussion.  I am going to place her Cymbalta back to 60 mg and give her some BuSpar for the ongoing anxiety.  Risks, benefits, side effects of the medication were discussed.  She will continue the Vyvanse for the ADHD and continue the Inderal as needed for anxiety.  Continue Cymbalta for anxiety and depression just at the 60 mg dosage.  Also discussed how she has to make herself get up and go do things with friends and force herself to do those things as the more she isolates the worse that becomes.  Getting her set back up for therapy with diane.  I commended that she continue exercising.       Continuing efforts to promote the therapeutic alliance, address the patient's issues, and strengthen self awareness, insights, and coping skill      Patient is agreeable to call the Clinic with worsening symptoms.    Patient is aware to call 911 or go to the nearest ER should begin having SI/HI. RTC 6 weeks.  Sooner if needed             This document has been electronically signed by ANNA Vora on   July 30, 2024 09:21 EDT.

## 2024-08-26 ENCOUNTER — OFFICE VISIT (OUTPATIENT)
Dept: PSYCHIATRY | Facility: CLINIC | Age: 24
End: 2024-08-26
Payer: COMMERCIAL

## 2024-08-26 VITALS
SYSTOLIC BLOOD PRESSURE: 117 MMHG | BODY MASS INDEX: 45.53 KG/M2 | DIASTOLIC BLOOD PRESSURE: 81 MMHG | HEART RATE: 96 BPM | OXYGEN SATURATION: 98 % | WEIGHT: 247.4 LBS | HEIGHT: 62 IN

## 2024-08-26 DIAGNOSIS — F41.1 GENERALIZED ANXIETY DISORDER: Primary | ICD-10-CM

## 2024-08-26 DIAGNOSIS — F90.0 ATTENTION DEFICIT HYPERACTIVITY DISORDER (ADHD), PREDOMINANTLY INATTENTIVE TYPE: ICD-10-CM

## 2024-08-26 DIAGNOSIS — F43.10 PTSD (POST-TRAUMATIC STRESS DISORDER): ICD-10-CM

## 2024-08-26 DIAGNOSIS — F50.81 MILD BINGE-EATING DISORDER: ICD-10-CM

## 2024-08-26 DIAGNOSIS — F33.1 MDD (MAJOR DEPRESSIVE DISORDER), RECURRENT EPISODE, MODERATE: ICD-10-CM

## 2024-08-26 PROCEDURE — 99214 OFFICE O/P EST MOD 30 MIN: CPT | Performed by: NURSE PRACTITIONER

## 2024-08-26 PROCEDURE — 1160F RVW MEDS BY RX/DR IN RCRD: CPT | Performed by: NURSE PRACTITIONER

## 2024-08-26 PROCEDURE — 1159F MED LIST DOCD IN RCRD: CPT | Performed by: NURSE PRACTITIONER

## 2024-08-26 RX ORDER — DULOXETIN HYDROCHLORIDE 60 MG/1
60 CAPSULE, DELAYED RELEASE ORAL DAILY
Qty: 30 CAPSULE | Refills: 1 | Status: SHIPPED | OUTPATIENT
Start: 2024-08-26

## 2024-08-26 RX ORDER — BUSPIRONE HYDROCHLORIDE 15 MG/1
TABLET ORAL
Qty: 60 TABLET | Refills: 1 | Status: SHIPPED | OUTPATIENT
Start: 2024-08-26

## 2024-08-26 RX ORDER — PROPRANOLOL HYDROCHLORIDE 20 MG/1
20 TABLET ORAL 2 TIMES DAILY PRN
Qty: 60 TABLET | Refills: 1 | Status: SHIPPED | OUTPATIENT
Start: 2024-08-26

## 2024-08-26 NOTE — PROGRESS NOTES
Over the left      Subjective   Jany Pankaj is a 23 y.o. female is here today for medication management follow-up.   CC:  recheck on adhd, binge eating and depression  History of Present Illness:   Patient presents by herself for follow-up.  She states she continues with some ongoing anxiety but does feel a slight improvement.  She has went out to eat at a restaurant and went to the local festival.  Says she was also able to go to  her friends child's birthday celebration.  Rates depression as minimal.  Denies negative side effects to the meds.  Does not really know how well the inderal is working but she is taking it  sleep is adequate.  Pt states her anxiety is better if she gets busy at work when her mind is occupied.  Body mass index is 45.24 kg/m².  No major changes in weight.  She does begin psychotherapy tomorrow and looking forward to it. She does not feel the stimulant contributes to the anxiety as she feels it slows her mind and actually helps.  The stimulant continues to help with focus.           The following portions of the patient's history were reviewed and updated as appropriate: allergies, current medications, past family history, past medical history, past social history, past surgical history and problem list.    Review of Systems   Constitutional:  Negative for activity change, appetite change and fatigue.   HENT: Negative.     Eyes:  Negative for visual disturbance.   Respiratory: Negative.     Cardiovascular: Negative.    Gastrointestinal:  Negative for nausea.   Endocrine: Negative.    Genitourinary: Negative.    Musculoskeletal:  Negative for arthralgias.   Skin: Negative.    Allergic/Immunologic: Negative.    Neurological:  Negative for dizziness, seizures and headaches.   Hematological: Negative.    Psychiatric/Behavioral:  Negative for agitation, behavioral problems, confusion, decreased concentration, dysphoric mood, hallucinations, self-injury, sleep disturbance and suicidal ideas.  "The patient is nervous/anxious. The patient is not hyperactive.      Reviewed copied data and there are no changes    Objective   Physical Exam  Vitals reviewed.   Constitutional:       Appearance: Normal appearance.   Musculoskeletal:      Cervical back: Normal range of motion and neck supple.   Neurological:      General: No focal deficit present.      Mental Status: She is alert and oriented to person, place, and time.   Psychiatric:         Attention and Perception: Attention normal.         Mood and Affect: Mood normal.         Speech: Speech normal.         Behavior: Behavior normal. Behavior is cooperative.         Thought Content: Thought content normal.         Cognition and Memory: Cognition normal.      Comments: Pleasant and cooperative         Blood pressure 117/81, pulse 96, height 157.5 cm (62.01\"), weight 112 kg (247 lb 6.4 oz), SpO2 98%.    Medication List:   Current Outpatient Medications   Medication Sig Dispense Refill    busPIRone (BUSPAR) 15 MG tablet Take one half to 1 tablet PO BID 60 tablet 1    DULoxetine (CYMBALTA) 60 MG capsule Take 1 capsule by mouth Daily. 30 capsule 1    propranolol (INDERAL) 20 MG tablet Take 1 tablet by mouth 2 (Two) Times a Day As Needed (anxiety). 60 tablet 1    lisdexamfetamine (Vyvanse) 40 MG capsule Take 1 capsule by mouth Every Morning 30 capsule 0    omeprazole (priLOSEC) 40 MG capsule       vitamin D (ERGOCALCIFEROL) 1.25 MG (32736 UT) capsule capsule        No current facility-administered medications for this visit.     Reviewed copied data and there are no changes    Mental Status Exam:   Hygiene:   good  Cooperation:  Cooperative  Eye Contact:  Good  Psychomotor Behavior:  Appropriate  Affect:  Full range  Hopelessness: Denies  Speech:  Normal  Thought Process:  Goal directed  Thought Content:  Normal  Suicidal:  None  Homicidal:  None  Hallucinations:  None  Delusion:  None  Memory:  Intact  Orientation:  Person, Place, Time and Situation  Reliability:  " good  Insight:  Good  Judgement:  Good  Impulse Control:  Fair  Physical/Medical Issues:  No     Assessment & Plan   Problems Addressed this Visit          Mental Health    PTSD (post-traumatic stress disorder)    Relevant Medications    DULoxetine (CYMBALTA) 60 MG capsule    busPIRone (BUSPAR) 15 MG tablet    Attention deficit hyperactivity disorder (ADHD), predominantly inattentive type    Relevant Medications    DULoxetine (CYMBALTA) 60 MG capsule    busPIRone (BUSPAR) 15 MG tablet     Other Visit Diagnoses       Generalized anxiety disorder    -  Primary    Relevant Medications    DULoxetine (CYMBALTA) 60 MG capsule    busPIRone (BUSPAR) 15 MG tablet    propranolol (INDERAL) 20 MG tablet    MDD (major depressive disorder), recurrent episode, moderate        Relevant Medications    DULoxetine (CYMBALTA) 60 MG capsule    busPIRone (BUSPAR) 15 MG tablet    Mild binge-eating disorder        Relevant Medications    DULoxetine (CYMBALTA) 60 MG capsule    busPIRone (BUSPAR) 15 MG tablet          Diagnoses         Codes Comments    Generalized anxiety disorder    -  Primary ICD-10-CM: F41.1  ICD-9-CM: 300.02     MDD (major depressive disorder), recurrent episode, moderate     ICD-10-CM: F33.1  ICD-9-CM: 296.32     PTSD (post-traumatic stress disorder)     ICD-10-CM: F43.10  ICD-9-CM: 309.81     Attention deficit hyperactivity disorder (ADHD), predominantly inattentive type     ICD-10-CM: F90.0  ICD-9-CM: 314.00     Mild binge-eating disorder     ICD-10-CM: F50.81  ICD-9-CM: 307.1           Functionality: pt having minimal impairment in important areas of daily functioning.  Prognosis: Good dependent on medication/follow up and treatment plan compliance.  Tae reviewed.      Patient has seen some improvement.  She will continue the buspar, cymbalta and inderal for the anxiety.  She will continue the stimulant for the adhd and binge eating.  Refill submitted.  Not making any medication changes today going to give the  buspar some more time to see if she continues to get improvement as well as see how psychotherapy helps.  She is in agreement to the treatment plan.       Continuing efforts to promote the therapeutic alliance, address the patient's issues, and strengthen self awareness, insights, and coping skill      Patient is agreeable to call the Clinic with worsening symptoms.    Patient is aware to call 911 or go to the nearest ER should begin having SI/HI. RTC 6 weeks.  Sooner if needed             This document has been electronically signed by ANNA Vora on   August 26, 2024 19:00 EDT.

## 2024-10-15 ENCOUNTER — OFFICE VISIT (OUTPATIENT)
Dept: PSYCHIATRY | Facility: CLINIC | Age: 24
End: 2024-10-15
Payer: COMMERCIAL

## 2024-10-15 VITALS
SYSTOLIC BLOOD PRESSURE: 112 MMHG | WEIGHT: 251.6 LBS | OXYGEN SATURATION: 98 % | HEIGHT: 62 IN | BODY MASS INDEX: 46.3 KG/M2 | DIASTOLIC BLOOD PRESSURE: 74 MMHG | HEART RATE: 85 BPM

## 2024-10-15 DIAGNOSIS — F90.0 ATTENTION DEFICIT HYPERACTIVITY DISORDER (ADHD), PREDOMINANTLY INATTENTIVE TYPE: ICD-10-CM

## 2024-10-15 DIAGNOSIS — F33.1 MDD (MAJOR DEPRESSIVE DISORDER), RECURRENT EPISODE, MODERATE: ICD-10-CM

## 2024-10-15 DIAGNOSIS — F43.10 PTSD (POST-TRAUMATIC STRESS DISORDER): ICD-10-CM

## 2024-10-15 DIAGNOSIS — F41.1 GENERALIZED ANXIETY DISORDER: Primary | ICD-10-CM

## 2024-10-15 DIAGNOSIS — F50.810 MILD BINGE-EATING DISORDER: ICD-10-CM

## 2024-10-15 PROCEDURE — 1160F RVW MEDS BY RX/DR IN RCRD: CPT | Performed by: NURSE PRACTITIONER

## 2024-10-15 PROCEDURE — 1159F MED LIST DOCD IN RCRD: CPT | Performed by: NURSE PRACTITIONER

## 2024-10-15 PROCEDURE — 99214 OFFICE O/P EST MOD 30 MIN: CPT | Performed by: NURSE PRACTITIONER

## 2024-10-15 RX ORDER — LISDEXAMFETAMINE DIMESYLATE 40 MG/1
40 CAPSULE ORAL EVERY MORNING
Qty: 30 CAPSULE | Refills: 0 | Status: SHIPPED | OUTPATIENT
Start: 2024-10-15

## 2024-10-15 RX ORDER — PROPRANOLOL HCL 20 MG
20 TABLET ORAL 2 TIMES DAILY PRN
Qty: 60 TABLET | Refills: 1 | Status: SHIPPED | OUTPATIENT
Start: 2024-10-15

## 2024-10-15 RX ORDER — DULOXETIN HYDROCHLORIDE 60 MG/1
60 CAPSULE, DELAYED RELEASE ORAL DAILY
Qty: 30 CAPSULE | Refills: 1 | Status: SHIPPED | OUTPATIENT
Start: 2024-10-15

## 2024-10-15 RX ORDER — BUSPIRONE HYDROCHLORIDE 15 MG/1
TABLET ORAL
Qty: 60 TABLET | Refills: 1 | Status: SHIPPED | OUTPATIENT
Start: 2024-10-15

## 2024-10-15 NOTE — PROGRESS NOTES
"Over the left      Subjective   Jany Lira is a 24 y.o. female is here today for medication management follow-up.   CC:  recheck on adhd, binge eating and depression  History of Present Illness:   Patient presents by herself for follow-up.  She states she feels she is doing a \"little better\" than the last time.  She rates her depression at a 3 out of 10 with 10 being severe and rates anxiety as 6 out of 10.  She feels like both of these levels are currently manageable.  She does feel like the Vyvanse helps with focus and concentration but she forgets to take it most of the time.  Has failed her 's permit test several times and has not taken her Vyvanse before these tests.  She does use the Inderal and states it does help with the anxiety as well as the BuSpar.  Sleep is adequate.  Mood is stable.  No excessive irritability.Body mass index is 46.01 kg/m².  Weight gain of 4 pound since last office visit.  No acute medical stressors.  Continues to work full-time at [employer].      The following portions of the patient's history were reviewed and updated as appropriate: allergies, current medications, past family history, past medical history, past social history, past surgical history and problem list.    Review of Systems   Constitutional:  Negative for activity change, appetite change and fatigue.   HENT: Negative.     Eyes:  Negative for visual disturbance.   Respiratory: Negative.     Cardiovascular: Negative.    Gastrointestinal:  Negative for nausea.   Endocrine: Negative.    Genitourinary: Negative.    Musculoskeletal:  Negative for arthralgias.   Skin: Negative.    Allergic/Immunologic: Negative.    Neurological:  Negative for dizziness, seizures and headaches.   Hematological: Negative.    Psychiatric/Behavioral:  Negative for agitation, behavioral problems, confusion, decreased concentration, dysphoric mood, hallucinations, self-injury, sleep disturbance and suicidal ideas. The patient is " "nervous/anxious. The patient is not hyperactive.      Reviewed copied data and there are no changes    Objective   Physical Exam  Vitals reviewed.   Constitutional:       Appearance: Normal appearance.   Musculoskeletal:      Cervical back: Normal range of motion and neck supple.   Neurological:      General: No focal deficit present.      Mental Status: She is alert and oriented to person, place, and time.   Psychiatric:         Attention and Perception: Attention normal.         Mood and Affect: Mood normal.         Speech: Speech normal.         Behavior: Behavior normal. Behavior is cooperative.         Thought Content: Thought content normal.         Cognition and Memory: Cognition normal.      Comments: Pleasant and cooperative         Blood pressure 112/74, pulse 85, height 157.5 cm (62.01\"), weight 114 kg (251 lb 9.6 oz), SpO2 98%.    Medication List:   Current Outpatient Medications   Medication Sig Dispense Refill    busPIRone (BUSPAR) 15 MG tablet Take one half to 1 tablet PO BID 60 tablet 1    DULoxetine (CYMBALTA) 60 MG capsule Take 1 capsule by mouth Daily. 30 capsule 1    lisdexamfetamine (Vyvanse) 40 MG capsule Take 1 capsule by mouth Every Morning 30 capsule 0    propranolol (INDERAL) 20 MG tablet Take 1 tablet by mouth 2 (Two) Times a Day As Needed (anxiety). 60 tablet 1    omeprazole (priLOSEC) 40 MG capsule       vitamin D (ERGOCALCIFEROL) 1.25 MG (49032 UT) capsule capsule        No current facility-administered medications for this visit.     Reviewed copied data and there are no changes    Mental Status Exam:   Hygiene:   good  Cooperation:  Cooperative  Eye Contact:  Good  Psychomotor Behavior:  Appropriate  Affect:  Full range  Hopelessness: Denies  Speech:  Normal  Thought Process:  Goal directed  Thought Content:  Normal  Suicidal:  None  Homicidal:  None  Hallucinations:  None  Delusion:  None  Memory:  Intact  Orientation:  Person, Place, Time and Situation  Reliability:  good  Insight:  " Good  Judgement:  Good  Impulse Control:  Fair  Physical/Medical Issues:  No     Assessment & Plan   Problems Addressed this Visit          Mental Health    PTSD (post-traumatic stress disorder)    Relevant Medications    busPIRone (BUSPAR) 15 MG tablet    DULoxetine (CYMBALTA) 60 MG capsule    lisdexamfetamine (Vyvanse) 40 MG capsule    Attention deficit hyperactivity disorder (ADHD), predominantly inattentive type    Relevant Medications    busPIRone (BUSPAR) 15 MG tablet    DULoxetine (CYMBALTA) 60 MG capsule    lisdexamfetamine (Vyvanse) 40 MG capsule     Other Visit Diagnoses       Generalized anxiety disorder    -  Primary    Relevant Medications    busPIRone (BUSPAR) 15 MG tablet    DULoxetine (CYMBALTA) 60 MG capsule    lisdexamfetamine (Vyvanse) 40 MG capsule    propranolol (INDERAL) 20 MG tablet    MDD (major depressive disorder), recurrent episode, moderate        Relevant Medications    busPIRone (BUSPAR) 15 MG tablet    DULoxetine (CYMBALTA) 60 MG capsule    lisdexamfetamine (Vyvanse) 40 MG capsule    Mild binge-eating disorder        Relevant Medications    busPIRone (BUSPAR) 15 MG tablet    DULoxetine (CYMBALTA) 60 MG capsule    lisdexamfetamine (Vyvanse) 40 MG capsule          Diagnoses         Codes Comments    Generalized anxiety disorder    -  Primary ICD-10-CM: F41.1  ICD-9-CM: 300.02     MDD (major depressive disorder), recurrent episode, moderate     ICD-10-CM: F33.1  ICD-9-CM: 296.32     PTSD (post-traumatic stress disorder)     ICD-10-CM: F43.10  ICD-9-CM: 309.81     Attention deficit hyperactivity disorder (ADHD), predominantly inattentive type     ICD-10-CM: F90.0  ICD-9-CM: 314.00     Mild binge-eating disorder     ICD-10-CM: F50.810  ICD-9-CM: 307.1           Functionality: pt having minimal impairment in important areas of daily functioning.  Prognosis: Good dependent on medication/follow up and treatment plan compliance.  Tae reviewed.    She is currently pleased with her progress  and does not feel like she wants to make any medication changes.  I really recommended that she utilize her Vyvanse more and try to remember it more as it does seem to help her.  I am going to refill the Vyvanse today for the ADHD, continue Cymbalta for depression and anxiety continue BuSpar and propranolol as needed for anxiety refills of all been submitted.     Continuing efforts to promote the therapeutic alliance, address the patient's issues, and strengthen self awareness, insights, and coping skill      Patient is agreeable to call the Clinic with worsening symptoms.    Patient is aware to call 911 or go to the nearest ER should begin having SI/HI. RTC 8 weeks.  Sooner if needed             This document has been electronically signed by ANNA Vora on   October 15, 2024 15:57 EDT.

## 2024-12-16 ENCOUNTER — TELEMEDICINE (OUTPATIENT)
Dept: PSYCHIATRY | Facility: CLINIC | Age: 24
End: 2024-12-16
Payer: COMMERCIAL

## 2024-12-16 DIAGNOSIS — F43.10 PTSD (POST-TRAUMATIC STRESS DISORDER): ICD-10-CM

## 2024-12-16 DIAGNOSIS — F90.0 ATTENTION DEFICIT HYPERACTIVITY DISORDER (ADHD), PREDOMINANTLY INATTENTIVE TYPE: ICD-10-CM

## 2024-12-16 DIAGNOSIS — F33.1 MDD (MAJOR DEPRESSIVE DISORDER), RECURRENT EPISODE, MODERATE: ICD-10-CM

## 2024-12-16 DIAGNOSIS — F41.1 GENERALIZED ANXIETY DISORDER: ICD-10-CM

## 2024-12-16 DIAGNOSIS — F50.810 MILD BINGE-EATING DISORDER: ICD-10-CM

## 2024-12-16 PROCEDURE — 1160F RVW MEDS BY RX/DR IN RCRD: CPT | Performed by: NURSE PRACTITIONER

## 2024-12-16 PROCEDURE — 1159F MED LIST DOCD IN RCRD: CPT | Performed by: NURSE PRACTITIONER

## 2024-12-16 PROCEDURE — 99214 OFFICE O/P EST MOD 30 MIN: CPT | Performed by: NURSE PRACTITIONER

## 2024-12-16 RX ORDER — BUSPIRONE HYDROCHLORIDE 15 MG/1
TABLET ORAL
Qty: 60 TABLET | Refills: 2 | Status: SHIPPED | OUTPATIENT
Start: 2024-12-16

## 2024-12-16 RX ORDER — DULOXETIN HYDROCHLORIDE 60 MG/1
60 CAPSULE, DELAYED RELEASE ORAL DAILY
Qty: 30 CAPSULE | Refills: 2 | Status: SHIPPED | OUTPATIENT
Start: 2024-12-16

## 2024-12-16 RX ORDER — PROPRANOLOL HCL 20 MG
20 TABLET ORAL 2 TIMES DAILY PRN
Qty: 60 TABLET | Refills: 2 | Status: SHIPPED | OUTPATIENT
Start: 2024-12-16

## 2024-12-16 RX ORDER — LISDEXAMFETAMINE DIMESYLATE 40 MG/1
40 CAPSULE ORAL EVERY MORNING
Qty: 30 CAPSULE | Refills: 0 | Status: SHIPPED | OUTPATIENT
Start: 2024-12-16

## 2024-12-16 NOTE — PROGRESS NOTES
Over the left      Joanna Lira is a 24 y.o. female is here today for medication management follow-up. “This provider is located at [facility], [address] [place] [address]. The provider identified herself as well as her credentials.   The Patient is located at home. The patient's condition being diagnosed/treated is appropriate for telemedicine. The patient gave consent to be seen remotely, and when consent is given they understand that the consent allows for patient identifiable information to be sent to a third party as needed.   They may refuse to be seen remotely at any time. The electronic data is encrypted and password protected, and the patient has been advised of the potential risks to privacy not withstanding such measures.   CC:  recheck on adhd, binge eating and depression  History of Present Illness:   Patient presents by herself for follow-up.  She continues to work full time at [employer].  She enjoys this.  Takes the vyvanse aprox 4-5 days a week,.  Says it continues to help with focus and concentration.  She is doing well at her job.  Mood is stable and no excessive irritability.  She still has some depressive days but feels like it is more due to the short days now on the gloomy weather.  She denies overt depression.  Anxiety is situational but manageable.  She is utilizing the BuSpar as well as the propranolol and states that both of those helped.  Sleeping well at night without difficulty.  No medical stressors.  No negative side effects to meds.      The following portions of the patient's history were reviewed and updated as appropriate: allergies, current medications, past family history, past medical history, past social history, past surgical history and problem list.    Review of Systems   Constitutional:  Negative for activity change, appetite change and fatigue.   HENT: Negative.     Eyes:  Negative for visual disturbance.   Respiratory: Negative.      Cardiovascular: Negative.    Gastrointestinal:  Negative for nausea.   Endocrine: Negative.    Genitourinary: Negative.    Musculoskeletal:  Negative for arthralgias.   Skin: Negative.    Allergic/Immunologic: Negative.    Neurological:  Negative for dizziness, seizures and headaches.   Hematological: Negative.    Psychiatric/Behavioral:  Negative for agitation, behavioral problems, confusion, decreased concentration, dysphoric mood, hallucinations, self-injury, sleep disturbance and suicidal ideas. The patient is nervous/anxious. The patient is not hyperactive.      Reviewed copied data and there are no changes    Objective   Physical Exam  Vitals reviewed.   Constitutional:       Appearance: Normal appearance.   Musculoskeletal:      Cervical back: Normal range of motion and neck supple.   Neurological:      General: No focal deficit present.      Mental Status: She is alert and oriented to person, place, and time.   Psychiatric:         Attention and Perception: Attention normal.         Mood and Affect: Mood normal.         Speech: Speech normal.         Behavior: Behavior normal. Behavior is cooperative.         Thought Content: Thought content normal.         Cognition and Memory: Cognition normal.      Comments: Pleasant and cooperative         There were no vitals taken for this visit.    Medication List:   Current Outpatient Medications   Medication Sig Dispense Refill    busPIRone (BUSPAR) 15 MG tablet Take one half to 1 tablet PO BID 60 tablet 2    DULoxetine (CYMBALTA) 60 MG capsule Take 1 capsule by mouth Daily. 30 capsule 2    lisdexamfetamine (Vyvanse) 40 MG capsule Take 1 capsule by mouth Every Morning 30 capsule 0    propranolol (INDERAL) 20 MG tablet Take 1 tablet by mouth 2 (Two) Times a Day As Needed (anxiety). 60 tablet 2    omeprazole (priLOSEC) 40 MG capsule       vitamin D (ERGOCALCIFEROL) 1.25 MG (94353 UT) capsule capsule        No current facility-administered medications for this visit.      Reviewed copied data and there are no changes    Mental Status Exam:   Hygiene:   good  Cooperation:  Cooperative  Eye Contact:  Good  Psychomotor Behavior:  Appropriate  Affect:  Full range  Hopelessness: Denies  Speech:  Normal  Thought Process:  Goal directed  Thought Content:  Normal  Suicidal:  None  Homicidal:  None  Hallucinations:  None  Delusion:  None  Memory:  Intact  Orientation:  Person, Place, Time and Situation  Reliability:  good  Insight:  Good  Judgement:  Good  Impulse Control:  Fair  Physical/Medical Issues:  No     Assessment & Plan   Problems Addressed this Visit          Mental Health    PTSD (post-traumatic stress disorder)    Relevant Medications    busPIRone (BUSPAR) 15 MG tablet    DULoxetine (CYMBALTA) 60 MG capsule    lisdexamfetamine (Vyvanse) 40 MG capsule    Attention deficit hyperactivity disorder (ADHD), predominantly inattentive type    Relevant Medications    busPIRone (BUSPAR) 15 MG tablet    DULoxetine (CYMBALTA) 60 MG capsule    lisdexamfetamine (Vyvanse) 40 MG capsule     Other Visit Diagnoses       Generalized anxiety disorder        Relevant Medications    busPIRone (BUSPAR) 15 MG tablet    DULoxetine (CYMBALTA) 60 MG capsule    lisdexamfetamine (Vyvanse) 40 MG capsule    propranolol (INDERAL) 20 MG tablet    MDD (major depressive disorder), recurrent episode, moderate        Relevant Medications    busPIRone (BUSPAR) 15 MG tablet    DULoxetine (CYMBALTA) 60 MG capsule    lisdexamfetamine (Vyvanse) 40 MG capsule    Mild binge-eating disorder        Relevant Medications    busPIRone (BUSPAR) 15 MG tablet    DULoxetine (CYMBALTA) 60 MG capsule    lisdexamfetamine (Vyvanse) 40 MG capsule          Diagnoses         Codes Comments    Generalized anxiety disorder     ICD-10-CM: F41.1  ICD-9-CM: 300.02     MDD (major depressive disorder), recurrent episode, moderate     ICD-10-CM: F33.1  ICD-9-CM: 296.32     PTSD (post-traumatic stress disorder)     ICD-10-CM: F43.10  ICD-9-CM:  309.81     Attention deficit hyperactivity disorder (ADHD), predominantly inattentive type     ICD-10-CM: F90.0  ICD-9-CM: 314.00     Mild binge-eating disorder     ICD-10-CM: F50.810  ICD-9-CM: 307.1           Functionality: pt having minimal impairment in important areas of daily functioning.  Prognosis: Good dependent on medication/follow up and treatment plan compliance.  Tae reviewed.    She is currently pleased with her progress and does not feel like she wants to make any medication changes.  Continue the Vyvanse for the ADHD, continue Cymbalta for depression and anxiety continue BuSpar and propranolol as needed for anxiety refills of all been submitted.     Continuing efforts to promote the therapeutic alliance, address the patient's issues, and strengthen self awareness, insights, and coping skill      Patient is agreeable to call the Clinic with worsening symptoms.    Patient is aware to call 911 or go to the nearest ER should begin having SI/HI. RTC 12 weeks.  Sooner if needed.  Next visit will be in person                 This document has been electronically signed by ANNA Vora on   December 16, 2024 09:42 EST.

## 2025-03-03 ENCOUNTER — OFFICE VISIT (OUTPATIENT)
Dept: PSYCHIATRY | Facility: CLINIC | Age: 25
End: 2025-03-03
Payer: COMMERCIAL

## 2025-03-03 VITALS
BODY MASS INDEX: 47.07 KG/M2 | DIASTOLIC BLOOD PRESSURE: 81 MMHG | OXYGEN SATURATION: 95 % | HEART RATE: 90 BPM | SYSTOLIC BLOOD PRESSURE: 114 MMHG | WEIGHT: 255.8 LBS | HEIGHT: 62 IN

## 2025-03-03 DIAGNOSIS — F33.1 MDD (MAJOR DEPRESSIVE DISORDER), RECURRENT EPISODE, MODERATE: Primary | ICD-10-CM

## 2025-03-03 DIAGNOSIS — F43.10 PTSD (POST-TRAUMATIC STRESS DISORDER): ICD-10-CM

## 2025-03-03 DIAGNOSIS — F41.1 GENERALIZED ANXIETY DISORDER: ICD-10-CM

## 2025-03-03 PROCEDURE — 1159F MED LIST DOCD IN RCRD: CPT | Performed by: NURSE PRACTITIONER

## 2025-03-03 PROCEDURE — 99214 OFFICE O/P EST MOD 30 MIN: CPT | Performed by: NURSE PRACTITIONER

## 2025-03-03 PROCEDURE — 1160F RVW MEDS BY RX/DR IN RCRD: CPT | Performed by: NURSE PRACTITIONER

## 2025-03-03 RX ORDER — DULOXETIN HYDROCHLORIDE 60 MG/1
60 CAPSULE, DELAYED RELEASE ORAL DAILY
Qty: 30 CAPSULE | Refills: 2 | Status: SHIPPED | OUTPATIENT
Start: 2025-03-03

## 2025-03-03 NOTE — PROGRESS NOTES
Over the left      Subjective   Jany Lira is a 24 y.o. female is here today for medication management follow-up.   History of Present Illness:   Patient presents by herself for follow-up.  She states she is doing well.  She actually quit both her Vyvanse and BuSpar and states she felt like they were actually contributing to higher levels of anxiety.  She says things at home are more calm with her home life and she feels like those medications are needed.  She really likes the way the Vyvanse helps her with focus and concentration but it was also contributing to her anxiety.  She is also having more binge eating since she is not taking the Vyvanse.  This bothers her.  She rates her depression at a 3 out of 10 with 10 being severe.  No thoughts of self-harm.Body mass index is 46.77 kg/m².  She does show a weight gain of 4 pound since last office visit.  She has a plan to become more active with the weather warming up and plans on exercising.  She does do yoga a few times each week.  Continues to work full-time.  Mood is stable.  No anger outbursts.    The following portions of the patient's history were reviewed and updated as appropriate: allergies, current medications, past family history, past medical history, past social history, past surgical history and problem list.    Review of Systems   Constitutional:  Negative for activity change, appetite change and fatigue.   HENT: Negative.     Eyes:  Negative for visual disturbance.   Respiratory: Negative.     Cardiovascular: Negative.    Gastrointestinal:  Negative for nausea.   Endocrine: Negative.    Genitourinary: Negative.    Musculoskeletal:  Negative for arthralgias.   Skin: Negative.    Allergic/Immunologic: Negative.    Neurological:  Negative for dizziness, seizures and headaches.   Hematological: Negative.    Psychiatric/Behavioral:  Negative for agitation, behavioral problems, confusion, decreased concentration, dysphoric mood, hallucinations,  "self-injury, sleep disturbance and suicidal ideas. The patient is nervous/anxious. The patient is not hyperactive.      Reviewed copied data and there are no changes    Objective   Physical Exam  Vitals reviewed.   Constitutional:       Appearance: Normal appearance.   Musculoskeletal:      Cervical back: Normal range of motion and neck supple.   Neurological:      General: No focal deficit present.      Mental Status: She is alert and oriented to person, place, and time.   Psychiatric:         Attention and Perception: Attention normal.         Mood and Affect: Mood normal.         Speech: Speech normal.         Behavior: Behavior normal. Behavior is cooperative.         Thought Content: Thought content normal.         Cognition and Memory: Cognition normal.      Comments: Pleasant and cooperative         Blood pressure 114/81, pulse 90, height 157.5 cm (62.01\"), weight 116 kg (255 lb 12.8 oz), SpO2 95%.    Medication List:   Current Outpatient Medications   Medication Sig Dispense Refill    DULoxetine (CYMBALTA) 60 MG capsule Take 1 capsule by mouth Daily. 30 capsule 2    omeprazole (priLOSEC) 40 MG capsule       vitamin D (ERGOCALCIFEROL) 1.25 MG (33536 UT) capsule capsule        No current facility-administered medications for this visit.     Reviewed copied data and there are no changes    Mental Status Exam:   Hygiene:   good  Cooperation:  Cooperative  Eye Contact:  Good  Psychomotor Behavior:  Appropriate  Affect:  Full range  Hopelessness: Denies  Speech:  Normal  Thought Process:  Goal directed  Thought Content:  Normal  Suicidal:  None  Homicidal:  None  Hallucinations:  None  Delusion:  None  Memory:  Intact  Orientation:  Person, Place, Time and Situation  Reliability:  good  Insight:  Good  Judgement:  Good  Impulse Control:  Fair  Physical/Medical Issues:  No     Assessment & Plan   Problems Addressed this Visit          Mental Health    PTSD (post-traumatic stress disorder)    Relevant Medications    " DULoxetine (CYMBALTA) 60 MG capsule     Other Visit Diagnoses       MDD (major depressive disorder), recurrent episode, moderate    -  Primary    Relevant Medications    DULoxetine (CYMBALTA) 60 MG capsule    Generalized anxiety disorder        Relevant Medications    DULoxetine (CYMBALTA) 60 MG capsule          Diagnoses         Codes Comments    MDD (major depressive disorder), recurrent episode, moderate    -  Primary ICD-10-CM: F33.1  ICD-9-CM: 296.32     Generalized anxiety disorder     ICD-10-CM: F41.1  ICD-9-CM: 300.02     PTSD (post-traumatic stress disorder)     ICD-10-CM: F43.10  ICD-9-CM: 309.81           Functionality: pt having minimal impairment in important areas of daily functioning.  Prognosis: Good dependent on medication/follow up and treatment plan compliance.  Tae reviewed.    Patient screened positive for depression based on a PHQ-9 score of 4 on 12/16/2024. Follow-up recommendations include: Prescribed antidepressant medication treatment.   She is currently pleased with progress.  Going to continue the Cymbalta for the anxiety and depression as well as PTSD.  Refills been submitted.  I recommended that she do start the exercise program as it would help combat depression and anxiety.  Also discussed therapy and how that can help with binge eating disorder.  She does not want to pursue therapy at this time but is considering it.     Continuing efforts to promote the therapeutic alliance, address the patient's issues, and strengthen self awareness, insights, and coping skill      Patient is agreeable to call the Clinic with worsening symptoms.    Patient is aware to call 911 or go to the nearest ER should begin having SI/HI. RTC 12 weeks.  Sooner if needed.  Next visit will be in person                 This document has been electronically signed by ANNA Vora on   March 3, 2025 10:16 EST.

## 2025-06-26 ENCOUNTER — OFFICE VISIT (OUTPATIENT)
Dept: GASTROENTEROLOGY | Facility: CLINIC | Age: 25
End: 2025-06-26
Payer: COMMERCIAL

## 2025-06-26 ENCOUNTER — HOSPITAL ENCOUNTER (OUTPATIENT)
Dept: GENERAL RADIOLOGY | Facility: HOSPITAL | Age: 25
Discharge: HOME | End: 2025-06-26
Payer: COMMERCIAL

## 2025-06-26 ENCOUNTER — RESULTS FOLLOW-UP (OUTPATIENT)
Dept: GASTROENTEROLOGY | Facility: CLINIC | Age: 25
End: 2025-06-26

## 2025-06-26 VITALS
BODY MASS INDEX: 46.08 KG/M2 | SYSTOLIC BLOOD PRESSURE: 149 MMHG | DIASTOLIC BLOOD PRESSURE: 96 MMHG | HEIGHT: 62 IN | HEART RATE: 78 BPM | WEIGHT: 250.4 LBS | RESPIRATION RATE: 18 BRPM

## 2025-06-26 DIAGNOSIS — K58.0 IRRITABLE BOWEL SYNDROME WITH DIARRHEA: ICD-10-CM

## 2025-06-26 DIAGNOSIS — K58.0 IRRITABLE BOWEL SYNDROME WITH DIARRHEA: Primary | ICD-10-CM

## 2025-06-26 PROCEDURE — 74018 RADEX ABDOMEN 1 VIEW: CPT

## 2025-06-26 PROCEDURE — 86364 TISS TRNSGLTMNASE EA IG CLAS: CPT

## 2025-06-26 PROCEDURE — 82785 ASSAY OF IGE: CPT

## 2025-06-26 PROCEDURE — 86003 ALLG SPEC IGE CRUDE XTRC EA: CPT

## 2025-06-26 PROCEDURE — 82784 ASSAY IGA/IGD/IGG/IGM EACH: CPT

## 2025-06-26 PROCEDURE — 86231 EMA EACH IG CLASS: CPT

## 2025-06-26 PROCEDURE — 86008 ALLG SPEC IGE RECOMB EA: CPT

## 2025-06-26 PROCEDURE — 86258 DGP ANTIBODY EACH IG CLASS: CPT

## 2025-06-26 PROCEDURE — 1160F RVW MEDS BY RX/DR IN RCRD: CPT

## 2025-06-26 PROCEDURE — 1159F MED LIST DOCD IN RCRD: CPT

## 2025-06-26 PROCEDURE — 99204 OFFICE O/P NEW MOD 45 MIN: CPT

## 2025-06-26 NOTE — TELEPHONE ENCOUNTER
I spoke to patient, notified her that per Hilda, Please let patient know that KUB was notable for a large stool burden. This is indicative of overflow diarrhea due to significant constipation. I would have  MiraLAX over-the-counter and start taking this 1 capful in 8 ounces of fluid twice a day.  Patient voiced understanding.

## 2025-06-26 NOTE — PROGRESS NOTES
"Date of Consultation:  6/26/2025  Referring Physician: ANNA Simon    Chief Complaint  No chief complaint on file.    Jany Lira is a 24 y.o. female who presents today to [facility] for No chief complaint on file..    HPI:   24-year-old female presents today for evaluation of frequent bowel movements.  Patient reports that she has 5-10 bowel movements per day that she rates as BSS 4-7.  She feels that she evacuates well and does not have to strain.  She does occasionally have bright red bleeding per rectum.  However, this happens very rarely typically <once per month.  She has not noticed any hematochezia or melena.  She is concerned because she gets headaches with sitting down on the toilet.  Patient states that it feels like \"abdomen beat up.\"  States that this happens with  sitting directly on the toilet before she has time to strain or initiate a bowel movement.  Sometimes with bowel movements, patient will become diaphoretic and shaky.  This is often accompanied with nausea and vomiting.  vomiting typically happens 2 times per week and consist of yellow stomach acid.  She notes having lower abdominal cramping that happens just prior to bowel movements.  She denies unintentional weight loss, hematemesis, coffee-ground emesis, or dysphagia.    Of note, patient has a history of pancreatitis secondary to cholelithiasis/choledocholithiasis.  She is s/p cholecystectomy.         Past Medical History:   Diagnosis Date    ADHD (attention deficit hyperactivity disorder)     Allergic     Anxiety     Bipolar disorder     Chronic pain disorder     back pain    Depression     H/O self-harm     Hypertension     Scoliosis     Self-injurious behavior     hx of cutting Feb 2016    Strep throat     Suicidal thoughts     Suicide attempt     took overdose Feb 2016    Urinary tract infection       Past Surgical History:   Procedure Laterality Date    GALLBLADDER SURGERY      NO " "PAST SURGERIES        Social History     Socioeconomic History    Marital status: Single   Tobacco Use    Smoking status: Never    Smokeless tobacco: Never   Vaping Use    Vaping status: Never Used   Substance and Sexual Activity    Alcohol use: No    Drug use: Yes     Types: Marijuana    Sexual activity: Not Currently     Partners: Male, Female     Birth control/protection: Condom        Current Medications:  Current Outpatient Medications   Medication Sig Dispense Refill    DULoxetine (CYMBALTA) 60 MG capsule Take 1 capsule by mouth Daily. 30 capsule 2    omeprazole (priLOSEC) 40 MG capsule  (Patient not taking: Reported on 6/26/2025)      vitamin D (ERGOCALCIFEROL) 1.25 MG (57710 UT) capsule capsule  (Patient not taking: Reported on 6/26/2025)       No current facility-administered medications for this visit.       Allergies:   No Known Allergies    Vitals:   /96 (BP Location: Left arm, Patient Position: Sitting, Cuff Size: Adult)   Pulse 78   Resp 18   Ht 157.5 cm (62.01\")   Wt 114 kg (250 lb 6.4 oz)   BMI 45.78 kg/m²   Estimated body mass index is 45.78 kg/m² as calculated from the following:    Height as of this encounter: 157.5 cm (62.01\").    Weight as of this encounter: 114 kg (250 lb 6.4 oz).    Jany Lira  reports that she has never smoked. She has never used smokeless tobacco. I have educated her on the risk of diseases from using tobacco products such as cancer, COPD, and heart disease.       ROS:   Review of Systems   Constitutional: Negative.    Respiratory: Negative.     Cardiovascular: Negative.    Gastrointestinal:  Positive for abdominal pain, diarrhea, nausea and vomiting. Negative for abdominal distention, anal bleeding, blood in stool, constipation and rectal pain.   Neurological:  Positive for headaches.   All other systems reviewed and are negative.       Physical Exam:   Physical Exam  Vitals reviewed. Exam conducted with a chaperone present.   Constitutional:       General: " She is not in acute distress.     Appearance: Normal appearance. She is well-groomed. She is obese. She is not toxic-appearing.   HENT:      Head: Normocephalic and atraumatic.      Mouth/Throat:      Mouth: Mucous membranes are moist.   Eyes:      Extraocular Movements: Extraocular movements intact.   Cardiovascular:      Rate and Rhythm: Normal rate and regular rhythm.      Heart sounds: Normal heart sounds. No murmur heard.  Pulmonary:      Effort: Pulmonary effort is normal. No respiratory distress.      Breath sounds: Normal breath sounds. No stridor. No wheezing or rales.   Abdominal:      General: Bowel sounds are normal. There is no distension.      Palpations: Abdomen is soft. There is no mass.      Tenderness: There is no abdominal tenderness. There is no guarding or rebound.      Hernia: No hernia is present.   Skin:     General: Skin is warm.      Coloration: Skin is not jaundiced.      Findings: No rash.   Neurological:      Mental Status: She is alert and oriented to person, place, and time.   Psychiatric:         Mood and Affect: Mood and affect normal.         Speech: Speech normal.         Behavior: Behavior normal. Behavior is cooperative.         Thought Content: Thought content normal.          Lab Results:   Lab Results   Component Value Date    WBC 11.03 (H) 02/18/2020    HGB 14.4 02/18/2020    HCT 44.2 02/18/2020    MCV 87.5 02/18/2020    RDW 12.3 02/18/2020     02/18/2020    NEUTRORELPCT 69.4 02/18/2020    LYMPHORELPCT 23.0 02/18/2020    MONORELPCT 5.4 02/18/2020    EOSRELPCT 1.3 02/18/2020    BASORELPCT 0.5 02/18/2020    NEUTROABS 7.65 (H) 02/18/2020    LYMPHSABS 2.54 02/18/2020       Lab Results   Component Value Date     02/18/2020    K 4.0 02/18/2020    CO2 23.9 02/18/2020     02/18/2020    BUN 11 02/18/2020    CREATININE 0.71 02/18/2020    EGFRIFNONA  02/18/2020      Comment:      Unable to calculate GFR, patient age <=18.    EGFRIFAFRI  02/18/2020      Comment:       Unable to calculate GFR, patient age <=18.    GLUCOSE 136 (H) 02/18/2020    CALCIUM 9.6 02/18/2020    ALKPHOS 85 02/18/2020    AST 21 02/18/2020    ALT 20 02/18/2020    BILITOT 0.2 02/18/2020    ALBUMIN 3.94 02/18/2020    PROTEINTOT 7.5 02/18/2020       Pathology:        Endoscopy:        Imaging:   XR Abdomen KUB  Result Date: 6/26/2025  Large stool burden.   This report was finalized on 6/26/2025 10:01 AM by Dr. Jose Espitia MD.      MRI Angiogram Head With & Without Contrast  Result Date: 4/17/2025  No acute intracranial abnormality and no abnormal enhancement. Images reviewed, interpreted, and dictated by Lucinda Gardner MD        Results review: During today's encounter, all relevant clinical data has been reviewed.      Assessment and Plan    1. Irritable bowel syndrome with diarrhea (Primary)  Will obtain the following lab work today.  Will also plan to get the following stool studies.  -     Celiac Comprehensive Panel  -     Alpha-Gal IgE Panel; Future  -     XR Abdomen KUB; Future  -     Calprotectin, Fecal - Stool, Per Rectum; Future  -     Pancreatic Elastase, Fecal - Stool, Per Rectum; Future  -     Clostridioides difficile Toxin - Stool, Per Rectum; Future  -     Gastrointestinal Panel, PCR - Stool, Per Rectum; Future  -     Ova & Parasite Examination - Stool, Per Rectum; Future  -     Alpha-Gal IgE Panel  Will obtain KUB to rule out overflow diarrhea as well.  Given lack of red flag  symptoms, will defer colonoscopy at this time.      New Medications:   No orders of the defined types were placed in this encounter.      Discontinued Medications:   There are no discontinued medications.     Visit Diagnoses:    ICD-10-CM ICD-9-CM   1. Irritable bowel syndrome with diarrhea  K58.0 564.1            Follow Up:   Return in about 6 weeks (around 8/7/2025).    The patient was in agreement with the plan and all questions were answered to patient's satisfaction.        This document has been electronically signed  by Hilda Soliman PA-C   June 26, 2025 13:27 EDT    Dictated Utilizing Dragon Dictation: Part of this note may be an electronic transcription/translation of spoken language to printed text using the Dragon Dictation System.    CC:  ANNA Simon April, APRN

## 2025-06-27 LAB
ENDOMYSIUM IGA SER QL: NEGATIVE
GLIADIN PEPTIDE IGA SER-ACNC: 3 UNITS (ref 0–19)
GLIADIN PEPTIDE IGG SER-ACNC: 1 UNITS (ref 0–19)
IGA SERPL-MCNC: 188 MG/DL (ref 87–352)
TTG IGA SER-ACNC: <2 U/ML (ref 0–3)
TTG IGG SER-ACNC: 4 U/ML (ref 0–5)

## 2025-06-28 LAB
ALPHA-GAL IGE QN: <0.1 KU/L
BEEF IGE QN: <0.1 KU/L
IGE SERPL-ACNC: 28 IU/ML (ref 6–495)
LAMB IGE QN: <0.1 KU/L
PORK IGE QN: <0.1 KU/L
SERVICE CMNT-IMP: NORMAL

## 2025-07-17 ENCOUNTER — OFFICE VISIT (OUTPATIENT)
Dept: PSYCHIATRY | Facility: CLINIC | Age: 25
End: 2025-07-17
Payer: COMMERCIAL

## 2025-07-17 VITALS
HEIGHT: 62 IN | OXYGEN SATURATION: 96 % | WEIGHT: 245.6 LBS | HEART RATE: 74 BPM | DIASTOLIC BLOOD PRESSURE: 87 MMHG | SYSTOLIC BLOOD PRESSURE: 121 MMHG | BODY MASS INDEX: 45.19 KG/M2

## 2025-07-17 DIAGNOSIS — F33.1 MDD (MAJOR DEPRESSIVE DISORDER), RECURRENT EPISODE, MODERATE: Primary | ICD-10-CM

## 2025-07-17 DIAGNOSIS — F43.10 PTSD (POST-TRAUMATIC STRESS DISORDER): ICD-10-CM

## 2025-07-17 DIAGNOSIS — F90.0 ATTENTION DEFICIT HYPERACTIVITY DISORDER (ADHD), PREDOMINANTLY INATTENTIVE TYPE: ICD-10-CM

## 2025-07-17 DIAGNOSIS — F41.1 GENERALIZED ANXIETY DISORDER: ICD-10-CM

## 2025-07-17 PROCEDURE — 1160F RVW MEDS BY RX/DR IN RCRD: CPT | Performed by: NURSE PRACTITIONER

## 2025-07-17 PROCEDURE — 1159F MED LIST DOCD IN RCRD: CPT | Performed by: NURSE PRACTITIONER

## 2025-07-17 PROCEDURE — 99214 OFFICE O/P EST MOD 30 MIN: CPT | Performed by: NURSE PRACTITIONER

## 2025-07-17 RX ORDER — BUPROPION HYDROCHLORIDE 150 MG/1
150 TABLET ORAL EVERY MORNING
Qty: 30 TABLET | Refills: 2 | Status: SHIPPED | OUTPATIENT
Start: 2025-07-17 | End: 2026-07-17

## 2025-07-17 RX ORDER — DULOXETIN HYDROCHLORIDE 60 MG/1
60 CAPSULE, DELAYED RELEASE ORAL DAILY
Qty: 30 CAPSULE | Refills: 2 | Status: SHIPPED | OUTPATIENT
Start: 2025-07-17

## 2025-07-17 NOTE — PROGRESS NOTES
Over the left      Subjective   Jany Pankaj is a 24 y.o. female is here today for medication management follow-up. Patient or patient representative verbalized consent for the use of Ambient Listening during the visit with  ANNA Vora for chart documentation. 7/17/2025  08:32 EDT   History of Present Illness:   History of Present Illness  The patient is a 24-year-old female who presents for evaluation of anxiety and depression.    She continues to work but is experiencing difficulties related to her mental health. Her employer has reduced her working hours due to these issues. She reports feeling emotionally overwhelmed, leading to thoughts of cutting self, although she has not acted on these thoughts. She has been off Vyvanse for several months and has noticed a return of racing thoughts and anxiety. She recalls that while on Vyvanse, her mind was quieter, but the medication also made her feel anxious and tired. She has previously taken Wellbutrin inconsistently and is open to trying it again. She finds it challenging to distinguish between her ADHD, anxiety, and depression symptoms. She does not have a history of seizures.Body mass index is 44.91 kg/m².     She recently consulted a gastroenterologist due to persistent nausea following a gallbladder issue. An x-ray revealed a large stool burden, indicating constipation. She is currently taking MiraLAX twice daily, which has resulted in less frequent bowel movements than before. She has a follow-up appointment scheduled with her gastroenterologist.    Social History:  - Continues to work, although hours have been reduced  [place]    Psychiatric History:  - Previous hospitalization in 2020  - Past use of Wellbutrin, not taken consistently    Pertinent Negatives:  - No history of seizures             The following portions of the patient's history were reviewed and updated as appropriate: allergies, current  "medications, past family history, past medical history, past social history, past surgical history and problem list.    Review of Systems   Constitutional:  Negative for activity change, appetite change and fatigue.   HENT: Negative.     Eyes:  Negative for visual disturbance.   Respiratory: Negative.     Cardiovascular: Negative.    Gastrointestinal:  Negative for nausea.   Endocrine: Negative.    Genitourinary: Negative.    Musculoskeletal:  Negative for arthralgias.   Skin: Negative.    Allergic/Immunologic: Negative.    Neurological:  Negative for dizziness, seizures and headaches.   Hematological: Negative.    Psychiatric/Behavioral:  Negative for agitation, behavioral problems, confusion, decreased concentration, dysphoric mood, hallucinations, self-injury, sleep disturbance and suicidal ideas. The patient is nervous/anxious. The patient is not hyperactive.      Reviewed copied data and there are no changes    Objective   Physical Exam  Vitals reviewed.   Constitutional:       Appearance: Normal appearance.   Musculoskeletal:      Cervical back: Normal range of motion and neck supple.   Neurological:      General: No focal deficit present.      Mental Status: She is alert and oriented to person, place, and time.   Psychiatric:         Attention and Perception: Attention normal.         Mood and Affect: Mood normal.         Speech: Speech normal.         Behavior: Behavior normal. Behavior is cooperative.         Thought Content: Thought content normal.         Cognition and Memory: Cognition normal.      Comments: Pleasant and cooperative         Blood pressure 121/87, pulse 74, height 157.5 cm (62.01\"), weight 111 kg (245 lb 9.6 oz), SpO2 96%.    Medication List:   Current Outpatient Medications   Medication Sig Dispense Refill    DULoxetine (CYMBALTA) 60 MG capsule Take 1 capsule by mouth Daily. 30 capsule 2    buPROPion XL (Wellbutrin XL) 150 MG 24 hr tablet Take 1 tablet by mouth Every Morning. 30 tablet 2 "     No current facility-administered medications for this visit.     Reviewed copied data and there are no changes    Mental Status Exam:   Hygiene:   good  Cooperation:  Cooperative  Eye Contact:  Good  Psychomotor Behavior:  Appropriate  Affect:  Full range  Hopelessness: Denies  Speech:  Normal  Thought Process:  Goal directed  Thought Content:  Normal  Suicidal:  None  Homicidal:  None  Hallucinations:  None  Delusion:  None  Memory:  Intact  Orientation:  Person, Place, Time and Situation  Reliability:  good  Insight:  Good  Judgement:  Good  Impulse Control:  Fair  Physical/Medical Issues:  No     Assessment & Plan   Problems Addressed this Visit          Mental Health    PTSD (post-traumatic stress disorder)    Relevant Medications    buPROPion XL (Wellbutrin XL) 150 MG 24 hr tablet    DULoxetine (CYMBALTA) 60 MG capsule    Attention deficit hyperactivity disorder (ADHD), predominantly inattentive type    Relevant Medications    buPROPion XL (Wellbutrin XL) 150 MG 24 hr tablet    DULoxetine (CYMBALTA) 60 MG capsule     Other Visit Diagnoses         MDD (major depressive disorder), recurrent episode, moderate    -  Primary    Relevant Medications    buPROPion XL (Wellbutrin XL) 150 MG 24 hr tablet    DULoxetine (CYMBALTA) 60 MG capsule      Generalized anxiety disorder        Relevant Medications    buPROPion XL (Wellbutrin XL) 150 MG 24 hr tablet    DULoxetine (CYMBALTA) 60 MG capsule          Diagnoses         Codes Comments      MDD (major depressive disorder), recurrent episode, moderate    -  Primary ICD-10-CM: F33.1  ICD-9-CM: 296.32       Generalized anxiety disorder     ICD-10-CM: F41.1  ICD-9-CM: 300.02       Attention deficit hyperactivity disorder (ADHD), predominantly inattentive type     ICD-10-CM: F90.0  ICD-9-CM: 314.00       PTSD (post-traumatic stress disorder)     ICD-10-CM: F43.10  ICD-9-CM: 309.81           Functionality: pt having minimal impairment in important areas of daily  functioning.  Prognosis: Good dependent on medication/follow up and treatment plan compliance.  Tae reviewed.    Assessment & Plan  Problems:  - Anxiety  - Depression  - Constipation    Content of Therapy:  During the session, the patient discussed her increased anxiety and racing thoughts since discontinuing Vyvanse. She expressed feelings of being overwhelmed emotionally, leading to thoughts of self-harm, although she has not acted on these thoughts. The patient also mentioned her struggles at work, including reduced hours due to her mental health issues. She explored the potential benefits of Wellbutrin for managing her anxiety and depression. Additionally, she shared her experiences with constipation and the current use of MiraLAX.    Clinical Impression:  The patient presents with heightened anxiety and racing thoughts, contributing to her depressive symptoms. She reports feeling emotionally overwhelmed and has considered self-harm but has not acted on these thoughts. Her anxiety appears to be exacerbated by work-related stress and discontinuation of Vyvanse. She is open to trying Wellbutrin to manage her symptoms. Her constipation remains unresolved despite the use of MiraLAX, necessitating further evaluation by her GI doctor.    Therapeutic Intervention:  - Prescription of Wellbutrin 150 mg to address anxiety and depressive symptoms.  - Discussion of potential side effects of Wellbutrin, including increased heart rate and blood pressure.  - Encouragement to continue the medication for 2 to 3 weeks to assess effectiveness.  - Refill of Cymbalta sent to pharmacy.    Plan:  - Start Wellbutrin 150 mg. Risks, side effects, of meds discussed   - Monitor symptoms and report any adverse effects.  - Continue current medication regimen, including Cymbalta.  - Follow-up with GI doctor regarding constipation.    Follow-up:  - Next appointment scheduled for 08/25/2025 at 10:30 AM.    Notes & Risk Factors:  - Patient  has considered self-harm but has protective factors, including support from her mother and sister.  - No history of seizures.   Patient screened positive for depression based on a PHQ-9 score of 4 on 12/16/2024. Follow-up recommendations include: Prescribed antidepressant medication treatment.        Continuing efforts to promote the therapeutic alliance, address the patient's issues, and strengthen self awareness, insights, and coping skill      Patient is agreeable to call the Clinic with worsening symptoms.    Patient is aware to call 911 or go to the nearest ER should begin having SI/HI. RTC 12 weeks.  Sooner if needed.  Next visit will be in person                 This document has been electronically signed by ANNA Vora on   July 17, 2025 10:32 EDT.